# Patient Record
Sex: MALE | Race: WHITE
[De-identification: names, ages, dates, MRNs, and addresses within clinical notes are randomized per-mention and may not be internally consistent; named-entity substitution may affect disease eponyms.]

---

## 2021-12-11 ENCOUNTER — HOSPITAL ENCOUNTER (OUTPATIENT)
Dept: HOSPITAL 47 - LABPAT | Age: 61
Discharge: HOME | End: 2021-12-11
Attending: ORTHOPAEDIC SURGERY
Payer: COMMERCIAL

## 2021-12-11 DIAGNOSIS — Z01.812: Primary | ICD-10-CM

## 2021-12-11 DIAGNOSIS — Z22.322: ICD-10-CM

## 2021-12-11 DIAGNOSIS — M17.11: ICD-10-CM

## 2021-12-11 LAB
ANION GAP SERPL CALC-SCNC: 6 MMOL/L
BASOPHILS # BLD AUTO: 0.1 K/UL (ref 0–0.2)
BASOPHILS NFR BLD AUTO: 1 %
CHLORIDE SERPL-SCNC: 104 MMOL/L (ref 98–107)
CO2 SERPL-SCNC: 28 MMOL/L (ref 22–30)
EOSINOPHIL # BLD AUTO: 0.1 K/UL (ref 0–0.7)
EOSINOPHIL NFR BLD AUTO: 2 %
ERYTHROCYTE [DISTWIDTH] IN BLOOD BY AUTOMATED COUNT: 4.68 M/UL (ref 4.3–5.9)
ERYTHROCYTE [DISTWIDTH] IN BLOOD: 13.7 % (ref 11.5–15.5)
HCT VFR BLD AUTO: 42.1 % (ref 39–53)
HGB BLD-MCNC: 14.4 GM/DL (ref 13–17.5)
INR PPP: 1.1 (ref ?–1.2)
LYMPHOCYTES # SPEC AUTO: 1.8 K/UL (ref 1–4.8)
LYMPHOCYTES NFR SPEC AUTO: 32 %
MCH RBC QN AUTO: 30.8 PG (ref 25–35)
MCHC RBC AUTO-ENTMCNC: 34.3 G/DL (ref 31–37)
MCV RBC AUTO: 90 FL (ref 80–100)
MONOCYTES # BLD AUTO: 0.4 K/UL (ref 0–1)
MONOCYTES NFR BLD AUTO: 8 %
NEUTROPHILS # BLD AUTO: 2.9 K/UL (ref 1.3–7.7)
NEUTROPHILS NFR BLD AUTO: 53 %
PLATELET # BLD AUTO: 219 K/UL (ref 150–450)
POTASSIUM SERPL-SCNC: 4.4 MMOL/L (ref 3.5–5.1)
PT BLD: 11.2 SEC (ref 9–12)
SODIUM SERPL-SCNC: 138 MMOL/L (ref 137–145)
WBC # BLD AUTO: 5.4 K/UL (ref 3.8–10.6)

## 2021-12-11 PROCEDURE — 80051 ELECTROLYTE PANEL: CPT

## 2021-12-11 PROCEDURE — 93005 ELECTROCARDIOGRAM TRACING: CPT

## 2021-12-11 PROCEDURE — 85025 COMPLETE CBC W/AUTO DIFF WBC: CPT

## 2021-12-11 PROCEDURE — 85610 PROTHROMBIN TIME: CPT

## 2021-12-11 PROCEDURE — 71046 X-RAY EXAM CHEST 2 VIEWS: CPT

## 2021-12-11 PROCEDURE — 87070 CULTURE OTHR SPECIMN AEROBIC: CPT

## 2021-12-11 NOTE — XR
EXAMINATION TYPE: XR chest 2V

 

DATE OF EXAM: 12/11/2021

 

COMPARISON: Chest x-ray 10/26/2012

 

HISTORY: Z01.818

 

TECHNIQUE:  Frontal and lateral views of the chest are obtained.

 

FINDINGS:  There is no focal air space opacity, pleural effusion, or pneumothorax seen.  The cardiac 
silhouette size is within normal limits.   The osseous structures are intact.

 

IMPRESSION:  No acute cardiopulmonary process.

## 2022-01-28 ENCOUNTER — HOSPITAL ENCOUNTER (OUTPATIENT)
Dept: HOSPITAL 47 - LABPAT | Age: 62
Discharge: HOME | End: 2022-01-28
Attending: INTERNAL MEDICINE
Payer: COMMERCIAL

## 2022-01-28 DIAGNOSIS — Z20.822: ICD-10-CM

## 2022-01-28 DIAGNOSIS — Z01.812: Primary | ICD-10-CM

## 2022-01-28 LAB
ANION GAP SERPL CALC-SCNC: 11.7 MMOL/L (ref 10–18)
BUN SERPL-SCNC: 19.5 MG/DL (ref 9–27)
CHLORIDE SERPL-SCNC: 104 MMOL/L (ref 96–109)
CO2 SERPL-SCNC: 26.3 MMOL/L (ref 20–27.5)
ERYTHROCYTE [DISTWIDTH] IN BLOOD BY AUTOMATED COUNT: 4.58 X 10*6/UL (ref 4.4–5.6)
ERYTHROCYTE [DISTWIDTH] IN BLOOD: 13 % (ref 11.5–14.5)
HCT VFR BLD AUTO: 40.7 % (ref 39.6–50)
HGB BLD-MCNC: 13.7 G/DL (ref 13–17)
MCH RBC QN AUTO: 29.9 PG (ref 27–32)
MCHC RBC AUTO-ENTMCNC: 33.7 G/DL (ref 32–37)
MCV RBC AUTO: 88.9 FL (ref 80–97)
PLATELET # BLD AUTO: 212 X 10*3/UL (ref 140–440)
POTASSIUM SERPL-SCNC: 4.7 MMOL/L (ref 3.5–5.5)
SODIUM SERPL-SCNC: 142 MMOL/L (ref 135–145)
WBC # BLD AUTO: 6.08 X 10*3/UL (ref 4.5–10)

## 2022-01-28 PROCEDURE — 82565 ASSAY OF CREATININE: CPT

## 2022-01-28 PROCEDURE — 84520 ASSAY OF UREA NITROGEN: CPT

## 2022-01-28 PROCEDURE — 85027 COMPLETE CBC AUTOMATED: CPT

## 2022-01-28 PROCEDURE — 80051 ELECTROLYTE PANEL: CPT

## 2022-02-01 ENCOUNTER — HOSPITAL ENCOUNTER (OUTPATIENT)
Dept: HOSPITAL 47 - CATHCVL | Age: 62
Discharge: HOME | End: 2022-02-01
Attending: INTERNAL MEDICINE
Payer: COMMERCIAL

## 2022-02-01 VITALS — BODY MASS INDEX: 32.5 KG/M2

## 2022-02-01 VITALS — SYSTOLIC BLOOD PRESSURE: 158 MMHG | DIASTOLIC BLOOD PRESSURE: 74 MMHG | HEART RATE: 74 BPM

## 2022-02-01 VITALS — RESPIRATION RATE: 18 BRPM | TEMPERATURE: 97.3 F

## 2022-02-01 DIAGNOSIS — I42.0: Primary | ICD-10-CM

## 2022-02-01 PROCEDURE — 93458 L HRT ARTERY/VENTRICLE ANGIO: CPT

## 2022-02-01 NOTE — CC
CARDIAC CATHETERIZATION REPORT



INDICATION:

New-onset dilated cardiomyopathy.



PROCEDURE NOTE:

After obtaining informed consent, left heart catheterization and coronary angiogram

were performed via the right radial artery using size 3.5 Agnieszka right and left

coronary catheters and a pigtail catheter.  Patient tolerated the procedure well

without any obvious immediate complications.



Right radial artery access was obtained using micropuncture technique.  Catheters and

wires were floated into the ascending aorta under fluoroscopic guidance.  Patient

received verapamil and intravenous heparin as per protocol.  Patient received moderate

conscious sedation.  Total sedation time was 15 minutes.  At the end of the procedure,

a TR band was applied for hemostasis as per protocol. Adequate hemostasis and pulse ox

were obtained.



FINDINGS:

HEMODYNAMICS: Left ventricular end-diastolic pressure is 16 mm.  There is a gradient of

5 mm across the valve.



LEFT VENTRICULOGRAM: Left ventriculogram was not performed.



ANGIOGRAPHIC DATA:



Left main coronary artery. Left main coronary artery is a normal-sized vessel and is

free of stenosis.  It divides into left anterior descending coronary artery and

circumflex coronary artery. LAD and its branches, circumflex coronary artery and its

branches are free of significant stenosis. Circumflex coronary artery is a codominant

vessel.



Right coronary artery. Right coronary artery is a codominant system and is free of

significant disease.



CONCLUSIONS:

1. Normal coronary arteries.

2. Mildly elevated left ventricular end-diastolic pressures.



PLAN:

Patient's management is going to be with optimal medical therapy.  He is already on ACE

inhibitors. I will add Toprol-XL 25 mg daily.





MMODL / IJN: [id number] / Job#: [id number]

## 2022-09-14 ENCOUNTER — HOSPITAL ENCOUNTER (OUTPATIENT)
Dept: HOSPITAL 47 - LABPAT | Age: 62
Discharge: HOME | End: 2022-09-14
Attending: ORTHOPAEDIC SURGERY
Payer: COMMERCIAL

## 2022-09-14 DIAGNOSIS — Z01.812: Primary | ICD-10-CM

## 2022-09-14 DIAGNOSIS — M17.11: ICD-10-CM

## 2022-09-14 DIAGNOSIS — Z22.322: ICD-10-CM

## 2022-09-14 PROCEDURE — 87070 CULTURE OTHR SPECIMN AEROBIC: CPT

## 2022-10-13 NOTE — P.HPOR
History of Present Illness


H&P Date: 10/13/22


Chief Complaint: Right knee pain





The patient is a 62-year-old male who presents with progressive right knee pain 

for the past several years worsening recently.  He notes swelling, stiffness, 

and giving way.  It is worse with weightbearing activities.  He tried previous 

medications and injections with only partial temporary relief.  He is having 

significant night symptoms.





Review of Systems





As per HPI





Past Medical History


Past Medical History: Asthma, COPD, Hyperlipidemia, Hypertension, Osteoarthritis

(OA)


Additional Past Medical History / Comment(s): Heart diseaseleft bundle branch 

block


History of Any Multi-Drug Resistant Organisms: None Reported


Past Surgical History: Heart Catheterization, Orthopedic Surgery


Additional Past Surgical History / Comment(s): rt hand trigger thumb, rt carpal 

tunnel, lump removed from neck, ganglion cyst removed right foot


Past Anesthesia/Blood Transfusion Reactions: No Reported Reaction


Smoking Status: Former smoker





- Past Family History


  ** Mother


Family Medical History: No Reported History





Medications and Allergies


                                Home Medications











 Medication  Instructions  Recorded  Confirmed  Type


 


Albuterol Inhaler [Ventolin 2 puff INHALATION Q6HR PRN 07/12/14 10/13/22 History





Inhaler]    


 


Ipratropium/Albuterol Sulfate 3 ml INHALATION QID PRN 07/12/14 10/13/22 History





[Duoneb 0.5 mg-3 mg/3 ml Soln]    


 


lisinopriL [Zestril] 10 mg PO HS 07/12/14 10/13/22 History


 


EPINEPHrine [Epipen 2-Alf] 0.3 mg IM ONCE PRN #1 ml 07/13/14 10/13/22 Rx


 


Atorvastatin [Lipitor] 20 mg PO HS 12/29/21 10/13/22 History


 


Desvenlafaxine Succinate [Pristiq] 100 mg PO DAILY 12/29/21 10/13/22 History


 


QUEtiapine [SEROquel] 100 mg PO HS 12/29/21 10/13/22 History


 


buPROPion HCL [Wellbutrin XL] 300 mg PO DAILY 12/29/21 10/13/22 History








                                    Allergies











Allergy/AdvReac Type Severity Reaction Status Date / Time


 


amlodipine besylate Allergy  Unknown Verified 10/13/22 10:20





[From Sahyan]     


 


budesonide [From Pulmicort] Allergy  Unknown Verified 10/13/22 10:20


 


dyclonine HCl [From Sucrets] Allergy  shortness Verified 10/13/22 10:20





   of breath  


 


hexylresorcinol Allergy  Unknown Verified 10/13/22 10:20





[From Sucrets]     


 


olmesartan medoxomil Allergy  Unknown Verified 10/13/22 10:20





[From Shayan]     


 


penicillin V potassium Allergy  Unknown Verified 10/13/22 10:20





[From Pen-Vee K]   Childhood  


 


venom-honey bee Allergy  Unknown Verified 10/13/22 10:20





[bee venom (honey bee)]     














Physical Examination





- Knee


  ** right


Appearance: effusion


Effusion grade: grade 1


Varus alignment in stance: 5 degrees


Tenderness with palpation: medial


Pain: with flexion


Gait: limping


ROM: extension: -20 degrees


ROM: flexion: 90 degrees


Strength: extension: 5/5


Strength: flexion: 5/5


Meniscal tests: medial meniscal tests: positive





Results


The patient is a 62-year-old male who is  well-developed and well-nourished, 

approximate 5 foot 8, 214 pounds of endomorphic habits.  HEENT exam is nonfocal,

neck is supple.  He has painless passive motion of the right hip.  Straight leg 

raise negative.  His distal neurovascular appears intact in the right lower 

extremity.








- Diagnostic results


Knee x-ray: image reviewed (3 views of the right knee obtained in the office 

show severe medial and patellofemoral compartment narrowing with bone-on-bone 

changes and subchondral sclerosis.)





Assessment and Plan


Assessment: 





Right knee severe medial and patellofemoral compartment osteoarthrosis


Plan: 





I talked the patient length regarding his condition, treatment options.  At this

point is quite limited because of pain related to his right knee has significant

pain related to his right knee osteoarthrosis despite conservative measures.  

After a thorough discussion he opted to proceed with surgery.  We'll plan to 

proceed with right total knee arthroplasty.  We will institute DVT prophylaxis 

postoperative.  The patient underwent preoperative medical and cardiac 

clearance.


Time with Patient: Less than 30

## 2022-10-14 ENCOUNTER — HOSPITAL ENCOUNTER (OUTPATIENT)
Dept: HOSPITAL 47 - OR | Age: 62
Setting detail: OBSERVATION
LOS: 1 days | Discharge: HOME HEALTH | End: 2022-10-15
Attending: ORTHOPAEDIC SURGERY | Admitting: ORTHOPAEDIC SURGERY
Payer: COMMERCIAL

## 2022-10-14 VITALS — RESPIRATION RATE: 16 BRPM

## 2022-10-14 DIAGNOSIS — Z91.030: ICD-10-CM

## 2022-10-14 DIAGNOSIS — J44.9: ICD-10-CM

## 2022-10-14 DIAGNOSIS — R11.2: ICD-10-CM

## 2022-10-14 DIAGNOSIS — I44.7: ICD-10-CM

## 2022-10-14 DIAGNOSIS — Z88.0: ICD-10-CM

## 2022-10-14 DIAGNOSIS — Z79.899: ICD-10-CM

## 2022-10-14 DIAGNOSIS — E78.5: ICD-10-CM

## 2022-10-14 DIAGNOSIS — I10: ICD-10-CM

## 2022-10-14 DIAGNOSIS — Z91.09: ICD-10-CM

## 2022-10-14 DIAGNOSIS — M17.11: Primary | ICD-10-CM

## 2022-10-14 DIAGNOSIS — Z87.891: ICD-10-CM

## 2022-10-14 DIAGNOSIS — Z98.890: ICD-10-CM

## 2022-10-14 DIAGNOSIS — F32.A: ICD-10-CM

## 2022-10-14 PROCEDURE — 64999 UNLISTED PX NERVOUS SYSTEM: CPT

## 2022-10-14 PROCEDURE — 64448 NJX AA&/STRD FEM NRV NFS IMG: CPT

## 2022-10-14 PROCEDURE — 88300 SURGICAL PATH GROSS: CPT

## 2022-10-14 PROCEDURE — 27447 TOTAL KNEE ARTHROPLASTY: CPT

## 2022-10-14 PROCEDURE — 97162 PT EVAL MOD COMPLEX 30 MIN: CPT

## 2022-10-14 PROCEDURE — 85025 COMPLETE CBC W/AUTO DIFF WBC: CPT

## 2022-10-14 PROCEDURE — 73560 X-RAY EXAM OF KNEE 1 OR 2: CPT

## 2022-10-14 PROCEDURE — 94640 AIRWAY INHALATION TREATMENT: CPT

## 2022-10-14 PROCEDURE — 76942 ECHO GUIDE FOR BIOPSY: CPT

## 2022-10-14 RX ADMIN — HYDROMORPHONE HYDROCHLORIDE PRN MG: 1 INJECTION, SOLUTION INTRAMUSCULAR; INTRAVENOUS; SUBCUTANEOUS at 15:08

## 2022-10-14 RX ADMIN — HYDROMORPHONE HYDROCHLORIDE PRN MG: 1 INJECTION, SOLUTION INTRAMUSCULAR; INTRAVENOUS; SUBCUTANEOUS at 15:00

## 2022-10-14 RX ADMIN — HYDROMORPHONE HYDROCHLORIDE PRN MG: 1 INJECTION, SOLUTION INTRAMUSCULAR; INTRAVENOUS; SUBCUTANEOUS at 16:03

## 2022-10-14 RX ADMIN — HYDROMORPHONE HYDROCHLORIDE PRN MG: 1 INJECTION, SOLUTION INTRAMUSCULAR; INTRAVENOUS; SUBCUTANEOUS at 14:10

## 2022-10-14 RX ADMIN — HYDROMORPHONE HYDROCHLORIDE PRN MG: 1 INJECTION, SOLUTION INTRAMUSCULAR; INTRAVENOUS; SUBCUTANEOUS at 20:32

## 2022-10-14 RX ADMIN — HYDROCODONE BITARTRATE AND ACETAMINOPHEN PRN EACH: 7.5; 325 TABLET ORAL at 16:44

## 2022-10-14 RX ADMIN — POTASSIUM CHLORIDE SCH MLS: 14.9 INJECTION, SOLUTION INTRAVENOUS at 09:32

## 2022-10-14 RX ADMIN — HYDROMORPHONE HYDROCHLORIDE PRN MG: 1 INJECTION, SOLUTION INTRAMUSCULAR; INTRAVENOUS; SUBCUTANEOUS at 17:46

## 2022-10-14 NOTE — XR
Right knee Limited

 

HISTORY: Status post right knee arthroplasty

 

2 views the right knee, correlation to prior right knee 9/14/2022

 

Bone mineralization is reduced. Patient is status post right knee arthroplasty. There is anatomic ali
gnment. Lucencies present within the soft tissues. There is soft tissue swelling. Prepatellar calcifi
c density is again noted. Enthesophyte at insertion of the quadriceps tendon is stable.

 

IMPRESSION: Orthopedic follow-up.

## 2022-10-14 NOTE — P.ANPRN
Procedure Note - Anesthesia





- Nerve Block Performed


  ** Right Adductor Canal Infusion


Time Out Performed: Yes (0945)


Date of Procedure: 10/14/22


Procedure Start Time: 09:46


Procedure Stop Time: 09:51


Location of Patient: PreOp


Indication: Acute Post-Operative Pain, Requested by Surgeon


Specifically requested for management of pain by : Adrian Moe


Sedation Type: Sedate with meaningful contact maintained


Preparation: Sterile Prep, Sterile Dressing


Position: Supine


Catheter Depth at Skin (cm): 8


Catheter: Indwelling


Needle Types: Manuel


Needle Gauge: 18


Ultrasound used to visualize needle placement: Yes


Ultrasound used to observe medication spread: Yes


Injectate: 0.5% Ropivacaine (see comment for volume) (15cc + 5cc nacl pf)


Blood Aspirated: No


Pain Paresthesia on Injection Noted: No


Resistance on Injection: Normal


Image Stored and Saved: Yes


Events: Uneventful and Well Tolerated

## 2022-10-14 NOTE — P.ANPRN
Procedure Note - Anesthesia





- Nerve Block Performed


  ** Right iPack Single


Time Out Performed: Yes (0945)


Date of Procedure: 10/14/22


Procedure Start Time: 09:52


Procedure Stop Time: 09:55


Location of Patient: PreOp


Indication: Acute Post-Operative Pain, Requested by Surgeon


Specifically requested for management of pain by DrKhalif: Adrian Moe


Sedation Type: Sedate with meaningful contact maintained


Preparation: Sterile Prep


Position: Supine


Catheter: None


Needle Types: Pajunk


Needle Gauge: 21


Ultrasound used to visualize needle placement: Yes


Ultrasound used to observe medication spread: Yes


Injectate: 0.5% Ropivacaine (see comment for volume) (15cc + 5cc nacl pf)


Blood Aspirated: No


Pain Paresthesia on Injection Noted: No


Resistance on Injection: Normal


Image Stored and Saved: Yes


Events: Uneventful and Well Tolerated

## 2022-10-14 NOTE — P.OP
Date of Procedure: 10/14/22


Preoperative Diagnosis: 


Right knee severe tricompartmental osteoarthrosis


Postoperative Diagnosis: 


Same


Procedure(s) Performed: 


Right total knee arthroplastycementedcruciate retaining


Implants: 


Depuy Attune size 7 cemented femoral component, size 6 cemented tibial 

component, 9 mm articular surface, 38 mm cemented patellar component.  This is a

cruciate retaining implant.


Anesthesia: regional, spinal


Surgeon: Adrian Moe


Assistant #1: Colin Monroe


Estimated Blood Loss (ml): 50


Pathology: other (Bone fragments)


Condition: stable


Disposition: PACU


Indications for Procedure: 


The patient is a 62-year-old male presents with progressive right knee pain 

secondary to osteoarthrosis despite conservative measures.  A discussion of the 

risks and benefits of operative intervention versus continued conservative 

measures was made with the patient.  He opted to proceed with surgery.  

Operative risks to include infection, neurovascular injury, development of blood

clots, fracture, possible component loosening/failure need for subsequent 

procedures was discussed.  Informed consent was obtained.


Operative Findings: 


As below


Description of Procedure: 


The patient was brought to the operating room, and after induction of spinal 

anesthesia the right lower extremity was prepped and draped in a normal fashion.

 The tourniquet was inflated to 270 mmHg.  A longitudinal incision extending 3 

finger breaths above  the superior pole of the patella extending to the medial 

aspect the tibial tubercle was then made.  The skin and subcutaneous tissues 

were divided sharply.  Electrocautery was used for hemostasis.  A medial 

parapatellar arthrotomy was then performed.  The medial soft tissues to include 

the superficial and deep portions of the medial collateral ligament as well as 

the medial hamstring tendons were elevated subperiosteally.  The proximal medial

tibia osteophytes were carefully removed.  The patella was everted.  The knee 

was flexed.  A portion of the retropatellar fat pad was excised sharply.  The 

anterior cruciate ligament was sacrificed.  A starting hole was made in the 

distal femur 1 cm anterior to the posterior cruciate origin.  An intramedullary 

femoral guide was gently inserted planning on 5  valgus distal cut with 9 mm 

distal resection.  The cutting block was pinned in place.  The distal cut was 

then made.  The posterior referencing sizing guide was utilized.  3  of 

external rotation was built into the system and verified off the trans-

epicondylar axis and the posterior condyles.  I felt size 7 was most 

appropriate.  The cutting block was pinned in place.  The anterior, posterior, 

and chamfer cuts were then made.  The bone fragments were removed.  A sulcus cut

was then made with the appropriate guide.  The trial size 7 femoral component 

was then placed and was fully seated.  There was good anterior to posterior and 

medial to lateral fit.  The distal peg holes were then drilled.  The trial 

component was then removed.  Attention was then paid towards preparing the 

proximal tibia.  An extra medullary guide was utilized in line with the tibial 

shaft and second metatarsal distally.  A 7  posterior slope was planned.  I 

planned on 2 mm resection from the medial compartment.  The cutting block was 

pinned in place.  The proximal tibial cut was then made.  The bone was removed 

in one fragment.  The remnants of the medial and lateral menisci were excised 

the capsule junction with electrocautery.  The tibia sized most appropriately at

size 6.  The posterior osteophytes off the distal femur were carefully removed 

with a curved osteotome.  The trial tibial and femoral components were placed 

along with a 9 millimeters articular surface.  I was able to obtain full flexion

and extension with good stability with varus and valgus stress.  After several 

flexion and extension cycles, the tibial rotation was marked with electrocautery

in line with the medial one third of the tibial tubercle.  Attention was then 

paid towards preparing the patella.  A patella reamer was utilized taking this 

down to 14 mm of bone stock.  A good flush cut was made.  The patella sized most

appropriately at 38 millimeters.  The peg holes were then drilled.  The trial 

component was placed.  The knee was taken through a range of motion.  I had good

patellofemoral tracking with no hands technique.  The trial components were then

removed.  The tibia was prepared in the appropriate rotation with appropriate 

drill and keel punch.  The flexion and extension gaps were checked and felt to 

be symmetric.  The posterior soft tissues were injected with ropivacaine.  The 

bony surfaces were prepared with pulsatile lavage and dried.  The deep tibial 

component was then cemented in place and was fully seated.  Excess cement was 

removed.  The femoral component was cemented in place and was fully seated.  

Again excess cement was removed.  The trial 9 millimeters surface was then 

inserted in the knee was put in full extension.  The patella component was 

cemented in place.  After the cement had sufficiently hardened, the knee was 

again taken through a range of motion.  Again there was good stability in 

flexion and extension with varus and valgus stress.  The trial articular surface

was then removed.  The final articular surface was placed and was impacted.  

Care was taken to avoid any soft tissue interposition.  Pulsatile lavage was 

again utilized.  The tourniquet was deflated with  approximately 60 minutes 

total tourniquet time.  There was minimal drainage therefore a deep drain was 

not placed.  The medial parapatellar arthrotomy was then closed with #2 Ethibond

suture.  The subcutaneous tissues were reapproximated interrupted 2-0 Vicryl 

sutures.  The skin was reapproximated with 3-0 subarticular strata fix suture.  

Skin tape and adhesive was applied.  A sterile dressing was applied.  The 

patient was then awoken from sedation and transferred to recovery room in good 

condition.  Blood loss was estimated at 50 milliliters.  No complications  were 

incurred.  Sponge and needle counts were correct at the end the case. Colin YOUSSEF assisted during the major components this case to include exposure, 

bone resection, and implantation.

## 2022-10-14 NOTE — P.CONS
History of Present Illness





- Reason for Consult


Consult date: 10/14/22





- History of Present Illness





The patient is a 62-year-old male with a PMH of COPD, hypertension, 

hyperlipidemia, and osteoarthritis who was admitted for an elective right total 

knee replacement.  The patient underwent the procedure earlier today with no 

immediate postoperative consultations.  The patient was seen at the bedside on 

the surgical unit.  He reported good control of his pain, rated at a 3 out of 10

at the time of interview.  He denied any additional complaints.  Reports that he

has not gotten out of bed as of yet and has not had a bowel movement or passed 

status.  Did report an episode of nausea which was alleviated with Zofran.  

Denies experiencing chest discomfort, sore throat, shortness of breath, fever, 

chills, cough.  Reports compliance with his medications at home.





Review of systems:


Pertinent positives and negatives as discussed in HPI, a complete review of 

systems was performed and all other systems are negative.





Physical examination:


General: non toxic, no distress, appears at stated age, obese


Derm: no unusual rashes/lesions, warm


Head: atraumatic, normocephalic, symmetric


Eyes: EOMI, no lid lag, anicteric sclera, pupils equal round reactive to light


ENT: Nose and ears atraumatic


Neck: No cervical lymphadenopathy, trachea midline, supple


Mouth: no lip lesion, mucus membranes moist


Cardiovascular: S1S2 reg, no murmur, positive dorsalis pedis pulse bilateral, no

edema


Lungs: CTA bilateral, no rhonchi, no rales, no accessory muscle use


Abdominal: soft,  nontender to palpation, no guarding


Ext: muscle strength 5 out of 5 in all extremities grossly except right lower 

extremity due to pain, right lower extremity Ace bandage in place, no gross 

muscle atrophy, no contractures, 


Neuro:  CN II-XI grossly intact, no gross focal neuro deficits


Psych: Alert, oriented, appropriate affect 





Assessment/plan





Chronic conditions: COPD, hypertension, hyperlipidemia


-Continue with home medications





Status post right total knee replacement


-Defer management including pain control and DVT prophylaxis to the primary 

surgery service





We appreciate this opportunity to be involved in this patient's care. We will 

follow the patient with you. For any further questions, please not hesitate to 

contact the sound inpatient team.





Past Medical History


Past Medical History: Asthma, COPD, Hyperlipidemia, Hypertension, Osteoarthritis

(OA)


Additional Past Medical History / Comment(s): Heart diseaseleft bundle branch 

block


History of Any Multi-Drug Resistant Organisms: None Reported


Past Surgical History: Heart Catheterization, Orthopedic Surgery


Additional Past Surgical History / Comment(s): rt hand trigger thumb, rt carpal 

tunnel, lump removed from neck, ganglion cyst removed right foot


Past Anesthesia/Blood Transfusion Reactions: No Reported Reaction


Past Psychological History: Depression


Smoking Status: Former smoker


Past Alcohol Use History: Occasional


Additional Past Alcohol Use History / Comment(s): quit smoking 10 years ago, 

1ppd, started in teens


Past Drug Use History: None Reported





- Past Family History


  ** Mother


Family Medical History: Hyperlipidemia





Medications and Allergies


                                Home Medications











 Medication  Instructions  Recorded  Confirmed  Type


 


Albuterol Inhaler [Ventolin 2 puff INHALATION Q6HR PRN 07/12/14 10/14/22 History





Inhaler]    


 


Ipratropium/Albuterol Sulfate 3 ml INHALATION QID PRN 07/12/14 10/14/22 History





[Duoneb 0.5 mg-3 mg/3 ml Soln]    


 


lisinopriL [Zestril] 10 mg PO HS 07/12/14 10/14/22 History


 


EPINEPHrine [Epipen 2-Alf] 0.3 mg IM ONCE PRN #1 ml 07/13/14 10/14/22 Rx


 


Atorvastatin [Lipitor] 20 mg PO HS 12/29/21 10/14/22 History


 


Desvenlafaxine Succinate [Pristiq] 100 mg PO DAILY 12/29/21 10/14/22 History


 


QUEtiapine [SEROquel] 100 mg PO HS 12/29/21 10/14/22 History


 


buPROPion HCL [Wellbutrin XL] 300 mg PO DAILY 12/29/21 10/14/22 History








                                    Allergies











Allergy/AdvReac Type Severity Reaction Status Date / Time


 


amlodipine besylate Allergy  Unknown Verified 10/14/22 08:54





[From Shayan]     


 


budesonide [From Pulmicort] Allergy  Unknown Verified 10/14/22 08:54


 


dyclonine HCl [From Sucrets] Allergy  shortness Verified 10/14/22 08:54





   of breath  


 


hexylresorcinol Allergy  Unknown Verified 10/14/22 08:54





[From Sucrets]     


 


olmesartan medoxomil Allergy  Unknown Verified 10/14/22 08:54





[From Shayan]     


 


penicillin V potassium Allergy  Unknown Verified 10/14/22 08:54





[From Pen-Vee K]   Childhood  


 


venom-honey bee Allergy  Unknown Verified 10/14/22 08:54





[bee venom (honey bee)]     














Physical Exam


Vitals: 


                                   Vital Signs











  Temp Pulse Pulse Pulse Resp BP BP


 


 10/14/22 18:14    88     163/80


 


 10/14/22 17:19  97.5 F L   97   17   182/95


 


 10/14/22 16:55    72   16   165/84


 


 10/14/22 16:40    92   18   185/85


 


 10/14/22 16:00   77    16   149/90


 


 10/14/22 15:30   75    16   149/104


 


 10/14/22 15:00   72    16   162/78


 


 10/14/22 14:30   77    18   161/77


 


 10/14/22 14:00   78    20   164/76


 


 10/14/22 13:30   61    16   156/83


 


 10/14/22 13:15   60    12   147/84


 


 10/14/22 13:00   75    16  147/81 


 


 10/14/22 12:42  96.8 F L  65    14  142/91 


 


 10/14/22 10:00     76  16  136/71 


 


 10/14/22 09:32  97.4 F L    87  16  174/91 














  Pulse Ox


 


 10/14/22 18:14 


 


 10/14/22 17:19  95


 


 10/14/22 16:55  96


 


 10/14/22 16:40  98


 


 10/14/22 16:00  97


 


 10/14/22 15:30  95


 


 10/14/22 15:00  95


 


 10/14/22 14:30  98


 


 10/14/22 14:00  99


 


 10/14/22 13:30  99


 


 10/14/22 13:15  98


 


 10/14/22 13:00  99


 


 10/14/22 12:42  100


 


 10/14/22 10:00  98


 


 10/14/22 09:32  98








                                Intake and Output











 10/14/22 10/14/22 10/14/22





 06:59 14:59 22:59


 


Intake Total  851 350


 


Output Total  50 400


 


Balance  801 -50


 


Intake:   


 


  IV  851 350


 


Output:   


 


  Urine   400


 


  Estimated Blood Loss  50 


 


Other:   


 


  # Voids   1


 


  Weight  94.6 kg 94.6 kg

## 2022-10-15 VITALS — TEMPERATURE: 98.6 F | SYSTOLIC BLOOD PRESSURE: 166 MMHG | DIASTOLIC BLOOD PRESSURE: 91 MMHG

## 2022-10-15 VITALS — HEART RATE: 78 BPM

## 2022-10-15 LAB
BASOPHILS # BLD AUTO: 0.03 X 10*3/UL (ref 0–0.1)
BASOPHILS NFR BLD AUTO: 0.2 %
EOSINOPHIL # BLD AUTO: 0 X 10*3/UL (ref 0.04–0.35)
EOSINOPHIL NFR BLD AUTO: 0 %
ERYTHROCYTE [DISTWIDTH] IN BLOOD BY AUTOMATED COUNT: 4.32 X 10*6/UL (ref 4.4–5.6)
ERYTHROCYTE [DISTWIDTH] IN BLOOD: 13.3 % (ref 11.5–14.5)
HCT VFR BLD AUTO: 38.4 % (ref 39.6–50)
HGB BLD-MCNC: 13 G/DL (ref 13–17)
IMM GRANULOCYTES BLD QL AUTO: 0.7 %
LYMPHOCYTES # SPEC AUTO: 1.3 X 10*3/UL (ref 0.9–5)
LYMPHOCYTES NFR SPEC AUTO: 9.3 %
MCH RBC QN AUTO: 30.1 PG (ref 27–32)
MCHC RBC AUTO-ENTMCNC: 33.9 G/DL (ref 32–37)
MCV RBC AUTO: 88.9 FL (ref 80–97)
MONOCYTES # BLD AUTO: 1.7 X 10*3/UL (ref 0.2–1)
MONOCYTES NFR BLD AUTO: 12.2 %
NEUTROPHILS # BLD AUTO: 10.86 X 10*3/UL (ref 1.8–7.7)
NEUTROPHILS NFR BLD AUTO: 77.6 %
NRBC BLD AUTO-RTO: 0 /100 WBCS (ref 0–0)
PLATELET # BLD AUTO: 253 X 10*3/UL (ref 140–440)
WBC # BLD AUTO: 13.99 X 10*3/UL (ref 4.5–10)

## 2022-10-15 RX ADMIN — POTASSIUM CHLORIDE SCH: 14.9 INJECTION, SOLUTION INTRAVENOUS at 07:52

## 2022-10-15 RX ADMIN — HYDROMORPHONE HYDROCHLORIDE PRN MG: 1 INJECTION, SOLUTION INTRAMUSCULAR; INTRAVENOUS; SUBCUTANEOUS at 10:41

## 2022-10-15 RX ADMIN — HYDROMORPHONE HYDROCHLORIDE PRN MG: 1 INJECTION, SOLUTION INTRAMUSCULAR; INTRAVENOUS; SUBCUTANEOUS at 00:42

## 2022-10-15 RX ADMIN — HYDROCODONE BITARTRATE AND ACETAMINOPHEN PRN EACH: 7.5; 325 TABLET ORAL at 07:56

## 2022-10-15 NOTE — P.PN
Progress Note - Text


Progress Note Date: 10/15/22 (0619)





Anesthesiology


Postop day 1 status post total knee arthroplasty with adductor canal catheter.  

Patient doing well.  VAS 8 out of 10rest able and tolerable.  Gross strength 

intact in lower extremity.  Denies fever.  Denies alterations in sensorium.  

Catheter site intact.





Heart regular rate


Lungs nonlabored


Abdomen nondistended





Assessment: Postop day 1 status post total knee arthroplasty with adductor canal

catheter


Plan: All questions answered.  Maintain catheter 2 more days with patient 

removal at home.  Instructions were given at discharge.

## 2022-10-15 NOTE — P.PN
Subjective


Progress Note Date: 10/15/22


Principal diagnosis: 





s/p knee replacement


Hospital Course: 





62-year-old male with a PMH of COPD, hypertension, hyperlipidemia, and 

osteoarthritis who was admitted for an elective right total knee replacement.  

The patient underwent the procedure yesterday with no immediate postoperative 

consultations.  Working with physical therapy.  Progressing well.





Subjective: 


Patient seen and examined at bedside.  No acute events overnight.  Patient felt 

a little sad about not being able to fully walk.  However, he does state that he

will slowly regain back his function.  He denies any chest pain, shortness of 

breath, abdominal pain, urinary or bowel complaints.





Pertinent positives and negatives as discussed above, a complete review of 

systems was performed and all other systems are negative.








Vitals Signs Reviewed. 





General: nontoxic, no distress, appears at stated age


Derm: warm, dry, dressing over right knee appears clean, dry, intact


Head: atraumatic, normocephalic, symmetric


Eyes: EOMI, no lid lag, anicteric sclera


Mouth: no lip lesion, mucus membranes moist


Cardiovascular: S1S2 reg, no murmur


Lungs: CTA bilateral, no rhonchi, no rales , no accessory muscle use


Abdominal: soft,  nontender to palpation, no guarding, no appreciable 

organomegaly


Ext: no gross muscle atrophy, no edema, no contractures, abductor canal pain 

catheter present on the right knee


Neuro:  CN II-XI grossly intact, no focal neuro deficits


Psych: Alert, oriented, appropriate affect 








Assessment and Plan:





Status post right total knee replacement


- DVT prophylaxis and pain management per orthopedics


-Anticipate discharge home today





Chronic medical conditions:


Hypertension


COPD


Dyslipidemia


Depression


-Continue home medications


-Medically optimized for discharge








Thank you for allowing us to participate in the care of this pleasant patient.  

Do not hesitate to contact us with questions.  Someone can be reached from the 

[facility] hospitalist group all hours of the day at [phone] or via 

perfect serve.








Objective





- Vital Signs


Vital signs: 


                                   Vital Signs











Temp  98.6 F   10/15/22 08:00


 


Pulse  78   10/15/22 09:42


 


Resp  16   10/15/22 08:00


 


BP  166/91   10/15/22 08:00


 


Pulse Ox  97   10/15/22 08:00


 


FiO2      








                                 Intake & Output











 10/14/22 10/15/22 10/15/22





 18:59 06:59 18:59


 


Intake Total 1201  


 


Output Total 450 700 


 


Balance 751 -700 


 


Weight 94.6 kg  


 


Intake:   


 


  IV 1201  


 


Output:   


 


  Urine 400 700 


 


  Estimated Blood Loss 50  


 


Other:   


 


  # Voids 1 1 














- Labs


CBC & Chem 7: 


                                 10/15/22 05:40





Labs: 


                  Abnormal Lab Results - Last 24 Hours (Table)











  10/15/22 Range/Units





  05:40 


 


WBC  13.99 H  (4.50-10.00)  X 10*3/uL


 


RBC  4.32 L  (4.40-5.60)  X 10*6/uL


 


Hct  38.4 L  (39.6-50.0)  %


 


Immature Gran #  0.10 H  (0.00-0.04)  X 10*3/uL


 


Neutrophils #  10.86 H  (1.80-7.70)  X 10*3/uL


 


Monocytes #  1.70 H  (0.20-1.00)  X 10*3/uL


 


Eosinophils #  0 L  (0.04-0.35)  X 10*3/uL

## 2022-10-15 NOTE — P.PN
Subjective


Progress Note Date: 10/15/22


Principal diagnosis: 





Right knee osteoarthritis


Patient was seen at bedside this morning resting comfortably in bed.  Patient 

says he has not worked with physical therapy yet today.  Patient says he would 

like to go home today and has his wife at home that can help him out.  Patient 

says he does have a walker for home.  Patient says he does have pain mostly 

located at the front and back of the knee currently.  Patient denies radiation 

of pain.  Patient says he has urinated several times since surgery yesterday.  

Patient says he has not had bowel movement yet, however, patient says he has 

been passing gas.  Patient denies chest pain, fever, chest breath, nausea, 

vomiting, change in vision, loss of bowel/bladder control.








Objective





- Vital Signs


Vital signs: 


                                   Vital Signs











Temp  98.6 F   10/15/22 08:00


 


Pulse  80   10/15/22 08:00


 


Resp  16   10/15/22 08:00


 


BP  166/91   10/15/22 08:00


 


Pulse Ox  97   10/15/22 08:00


 


FiO2      








                                 Intake & Output











 10/14/22 10/15/22 10/15/22





 18:59 06:59 18:59


 


Intake Total 1201  


 


Output Total 450 700 


 


Balance 751 -700 


 


Weight 94.6 kg  


 


Intake:   


 


  IV 1201  


 


Output:   


 


  Urine 400 700 


 


  Estimated Blood Loss 50  


 


Other:   


 


  # Voids 1 1 














- Exam


Right knee:


Incision is clean, dry, and intact.  The exofin fusion tape is in good 

condition.  There is minimal soft tissue swelling and ecchymosis surrounding the

medial and lateral aspects of the incision.  Calf is soft, no tenderness with 

palpation.  Plantar flexion, dorsiflexion, EHL, FHL are intact.  Sensory exam to

light touch throughout the extremity is intact, dorsal pedis pulses 2+.








Assessment and Plan


Assessment: 


1.  Right knee osteoarthritis





- Postoperative day 1 status post right total knee arthroplasty





Plan: 


1.  Right knee osteoarthritis - right total knee arthroplasty from yesterday, 

Friday, 10/14/2022.  Patient stable at bedside this morning.  Patient does have 

a walker at home.  Discharge home with health services today pending labs and 

physical therapy evaluation





2.  Appreciate medical management





3.  Pain management - Norco; IV meds only as necessary





4.  DVT prophylaxis - Xarelto in hospital. Eliquis 2.5 mg BID x 2 weeks





5.  GI prophylaxis - senna





6.  PT/OT - weightbearing as tolerated with walker





7.  Encourage incentive spirometer use





8.  Discharge planning - home with health services today.





Time with Patient: Less than 30

## 2022-10-15 NOTE — P.DS
Providers


Date of admission: 


10/15/22 05:17





Expected date of discharge: 10/15/22


Attending physician: 


Adrian Moe





Consults: 





                                        





10/14/22 12:57


Consult Physician Routine 


   Consulting Provider: Deonna Ryder


   Consult Reason/Comments: Medical Management s/p RTKA


   Do you want consulting provider notified?: Yes











Primary care physician: 


[facility] Course: 


Date of admission: 10/14/2022


Date of discharge: 10/15/2022





Admission diagnosis: Right knee osteoarthritis


Discharge diagnosis: Same





Attending physician: Dr. Moe





Surgical procedures: Right total knee arthroplasty





Brief history: Patient is a 62-year-old male with a history of progressive 

primary right knee osteoarthritis.  At this point patient has failed 

conservative treatment measures and has opted to proceed with a elective right 

total knee arthroplasty.





Hospital course: Details of patient's surgery can be found in operative report. 

Patient tolerated the procedure well and was subsequently transported to 

orthopedic floor.  Patient's orthopeidc and medical care was provided daily. 

Patient had daily laboratory tests performed for evaluation of overall blood 

counts.  Patient had daily physical therapy to include strengthening range of 

motion as well as education with walker ambulation. Patient was treated with 

Xarelto for their postoperative DVT prophylaxis during their inpatient stay.  

Patient was noted to have a relatively uneventful postoperative course.  Patient

reported satisfactory pain control with oral pain medications by postoperative 

day 1.  Patient showed satisfactory progress with physical therapy.  Patient 

moved steadily through the program and had no difficulty meeting the goals by 

postoperative day 1.  Given patient's otherwise satisfactory course and having 

met physical therapy goals, plan is to discharge patient home with health 

services on postoperative day 1.





Discharge condition/disposition: Patient will be discharged home with health 

services in stable condition.





Discharge medications: Instructions are given on resumption of patient's normal 

daily medications per primary care recommendation, in addition patient will be 

prescribed Norco; Colace; Eliquis 2.5 mg BID x 2 weeks.





Discharge instructions:


1.  Wound care and infection precautions, keep incision dry and covered while 

showering, no lotions, creams, moisturizers. No soaking, tubs, pools, hottubs. 

Do not scrub over the incision.


2.  Weight-bear as tolerated with walker / cane until follow-up.


3.  Ice and elevate when necessary.  Do not exceed 20 minutes per hour with ice 

pack.


4.  Utilize compression sleeve until seen at first follow up appointment.


5.  Visiting nursing care.


6.  Home physical therapy including home CPM.


7.  Pain meds and anticoagulants per prescription.


8.  Pain medication has potential to cause constipation. Increase oral fluid and

fiber intake. Contact primary care provider if you have not had a bowel movement

within 48 hours after discharge


9.  No anti-inflammatory medication until discussed at first post operative 

visit, this including Motrin, Aleve, Mobic, Diclofenac.


10.  Follow up in office at 2 weeks postop with Mateus Chandler PA-C / Colin Monroe PA-C


11. Follow up with your primary care doctor 7-10 days after discharge.


12. Contact [facility] with any questions, [phone].





Keep incision clean, dry, intact.  While showering, cover fusion tape with Saran

wrap.  Keep fusion tape on until follow-up appointment in office in 2 weeks





Medications: Norco; Colace; Eliquis 2.5 mg BID x 2 weeks.











Assessment: 


Right knee osteoarthritis


Procedures: 


Right total knee arthroplasty





Patient Condition at Discharge: Good





Plan - Discharge Summary


Discharge Rx Participant: No


New Discharge Prescriptions: 


New


   Docusate [Colace] 100 mg PO DAILY #30 capsule


   Apixaban [Eliquis] 2.5 mg PO BID #60 tab


   HYDROcodone/APAP 7.5-325MG [Norco 7.5] 1 - 2 each PO Q6HR PRN #36 tab


     PRN Reason: Pain





No Action


   Albuterol Inhaler [Ventolin Inhaler] 2 puff INHALATION Q6HR PRN


     PRN Reason: Shortness Of Breath


   lisinopriL [Zestril] 10 mg PO HS


   Ipratropium/Albuterol Sulfate [Duoneb 0.5 mg-3 mg/3 ml Soln] 3 ml INHALATION 

QID PRN


     PRN Reason: Dyspnea


   EPINEPHrine [Epipen 2-Alf] 0.3 mg IM ONCE PRN #1 ml


     PRN Reason: Anaphylaxis


   Desvenlafaxine Succinate [Pristiq] 100 mg PO DAILY


   QUEtiapine [SEROquel] 100 mg PO HS


   Atorvastatin [Lipitor] 20 mg PO HS


   buPROPion HCL [Wellbutrin XL] 300 mg PO DAILY


Discharge Medication List





Albuterol Inhaler [Ventolin Inhaler] 2 puff INHALATION Q6HR PRN 07/12/14 

[History]


Ipratropium/Albuterol Sulfate [Duoneb 0.5 mg-3 mg/3 ml Soln] 3 ml INHALATION QID

PRN 07/12/14 [History]


lisinopriL [Zestril] 10 mg PO HS 07/12/14 [History]


EPINEPHrine [Epipen 2-Alf] 0.3 mg IM ONCE PRN #1 ml 07/13/14 [Rx]


Atorvastatin [Lipitor] 20 mg PO HS 12/29/21 [History]


Desvenlafaxine Succinate [Pristiq] 100 mg PO DAILY 12/29/21 [History]


QUEtiapine [SEROquel] 100 mg PO HS 12/29/21 [History]


buPROPion HCL [Wellbutrin XL] 300 mg PO DAILY 12/29/21 [History]


Apixaban [Eliquis] 2.5 mg PO BID #60 tab 10/15/22 [Rx]


Docusate [Colace] 100 mg PO DAILY #30 capsule 10/15/22 [Rx]


HYDROcodone/APAP 7.5-325MG [Norco 7.5] 1 - 2 each PO Q6HR PRN #36 tab 10/15/22 

[Rx]








Follow up Appointment(s)/Referral(s): 


Colin Monroe PAC [PHYSICIAN ASSISTANT] - 2 Weeks


Patient Instructions/Handouts:  Knee Replacement (DC)


Activity/Diet/Wound Care/Special Instructions: 


Discharge instructions:


1.  Wound care and infection precautions, keep incision dry and covered while 

showering, no lotions, creams, moisturizers. No soaking, tubs, pools, hottubs. 

Do not scrub over the incision.


2.  Weight-bear as tolerated with walker / cane until follow-up.


3.  Ice and elevate when necessary.  Do not exceed 20 minutes per hour with ice 

pack.


4.  Utilize compression sleeve until seen at first follow up appointment.


5.  Visiting nursing care.


6.  Home physical therapy including home CPM.


7.  Pain meds and anticoagulants per prescription.


8.  Pain medication has potential to cause constipation. Increase oral fluid and

fiber intake. Contact primary care provider if you have not had a bowel movement

within 48 hours after discharge


9.  No anti-inflammatory medication until discussed at first post operative 

visit, this including Motrin, Aleve, Mobic, Diclofenac.


10.  Follow up in office at 2 weeks postop with Mateus Chandler PA-C / Colin Monroe PA-C


11. Follow up with your primary care doctor 7-10 days after discharge.


12. Contact [facility] with any questions, [phone].





Keep incision clean, dry, intact.  While showering, cover fusion tape with Saran

wrap.  Keep fusion tape on until follow-up appointment in office in 2 weeks





Medications: Norco; Colace; Eliquis 2.5 mg BID x 2 weeks.


Discharge Disposition: HOME WITH HOME HEALTH SERVICES

## 2024-08-13 ENCOUNTER — HOSPITAL ENCOUNTER (EMERGENCY)
Dept: HOSPITAL 47 - EC | Age: 64
Discharge: HOME | End: 2024-08-13
Payer: MEDICARE

## 2024-08-13 DIAGNOSIS — M54.16: Primary | ICD-10-CM

## 2024-08-13 PROCEDURE — 96372 THER/PROPH/DIAG INJ SC/IM: CPT

## 2024-08-13 PROCEDURE — 99283 EMERGENCY DEPT VISIT LOW MDM: CPT

## 2024-08-24 ENCOUNTER — HOSPITAL ENCOUNTER (INPATIENT)
Dept: HOSPITAL 47 - 4SSUR | Age: 64
LOS: 2 days | Discharge: HOME | DRG: 390 | End: 2024-08-26
Attending: INTERNAL MEDICINE | Admitting: INTERNAL MEDICINE
Payer: MEDICARE

## 2024-08-24 DIAGNOSIS — Z79.899: ICD-10-CM

## 2024-08-24 DIAGNOSIS — Z91.030: ICD-10-CM

## 2024-08-24 DIAGNOSIS — K52.9: ICD-10-CM

## 2024-08-24 DIAGNOSIS — F32.A: ICD-10-CM

## 2024-08-24 DIAGNOSIS — Z88.1: ICD-10-CM

## 2024-08-24 DIAGNOSIS — E78.5: ICD-10-CM

## 2024-08-24 DIAGNOSIS — F41.9: ICD-10-CM

## 2024-08-24 DIAGNOSIS — Z88.0: ICD-10-CM

## 2024-08-24 DIAGNOSIS — K56.600: Primary | ICD-10-CM

## 2024-08-24 DIAGNOSIS — I10: ICD-10-CM

## 2024-08-24 DIAGNOSIS — K56.7: ICD-10-CM

## 2024-08-24 DIAGNOSIS — Z88.8: ICD-10-CM

## 2024-08-24 PROCEDURE — 96374 THER/PROPH/DIAG INJ IV PUSH: CPT

## 2024-08-24 PROCEDURE — 74177 CT ABD & PELVIS W/CONTRAST: CPT

## 2024-08-24 PROCEDURE — 96361 HYDRATE IV INFUSION ADD-ON: CPT

## 2024-08-24 PROCEDURE — 96376 TX/PRO/DX INJ SAME DRUG ADON: CPT

## 2024-08-24 PROCEDURE — 80053 COMPREHEN METABOLIC PANEL: CPT

## 2024-08-24 PROCEDURE — 83735 ASSAY OF MAGNESIUM: CPT

## 2024-08-24 PROCEDURE — 87040 BLOOD CULTURE FOR BACTERIA: CPT

## 2024-08-24 PROCEDURE — 99285 EMERGENCY DEPT VISIT HI MDM: CPT

## 2024-08-24 PROCEDURE — 85025 COMPLETE CBC W/AUTO DIFF WBC: CPT

## 2024-08-24 PROCEDURE — 93005 ELECTROCARDIOGRAM TRACING: CPT

## 2024-08-25 VITALS — RESPIRATION RATE: 17 BRPM

## 2024-08-25 LAB
ALBUMIN SERPL-MCNC: 3.2 G/DL (ref 3.5–5)
ALBUMIN/GLOB SERPL: 1.5 {RATIO}
ALP SERPL-CCNC: 74 U/L (ref 38–126)
ALT SERPL-CCNC: 26 U/L (ref 4–49)
ANION GAP SERPL CALC-SCNC: 2 MMOL/L
AST SERPL-CCNC: 26 U/L (ref 17–59)
BASOPHILS # BLD AUTO: 0 K/UL (ref 0–0.2)
BASOPHILS NFR BLD AUTO: 0 %
BUN SERPL-SCNC: 20 MG/DL (ref 9–20)
CALCIUM SPEC-MCNC: 8 MG/DL (ref 8.4–10.2)
CHLORIDE SERPL-SCNC: 105 MMOL/L (ref 98–107)
CO2 SERPL-SCNC: 25 MMOL/L (ref 22–30)
EOSINOPHIL # BLD AUTO: 0.1 K/UL (ref 0–0.7)
EOSINOPHIL NFR BLD AUTO: 2 %
ERYTHROCYTE [DISTWIDTH] IN BLOOD BY AUTOMATED COUNT: 4.05 M/UL (ref 4.3–5.9)
ERYTHROCYTE [DISTWIDTH] IN BLOOD: 12.9 % (ref 11.5–15.5)
GLOBULIN SER CALC-MCNC: 2.2 G/DL
GLUCOSE BLD-MCNC: 73 MG/DL (ref 70–110)
GLUCOSE SERPL-MCNC: 66 MG/DL (ref 74–99)
HCT VFR BLD AUTO: 37.2 % (ref 39–53)
HGB BLD-MCNC: 12.5 GM/DL (ref 13–17.5)
LYMPHOCYTES # SPEC AUTO: 1.6 K/UL (ref 1–4.8)
LYMPHOCYTES NFR SPEC AUTO: 27 %
MAGNESIUM SPEC-SCNC: 2.1 MG/DL (ref 1.6–2.3)
MCH RBC QN AUTO: 30.7 PG (ref 25–35)
MCHC RBC AUTO-ENTMCNC: 33.5 G/DL (ref 31–37)
MCV RBC AUTO: 91.7 FL (ref 80–100)
MONOCYTES # BLD AUTO: 0.5 K/UL (ref 0–1)
MONOCYTES NFR BLD AUTO: 7 %
NEUTROPHILS # BLD AUTO: 3.8 K/UL (ref 1.3–7.7)
NEUTROPHILS NFR BLD AUTO: 62 %
PLATELET # BLD AUTO: 166 K/UL (ref 150–450)
POTASSIUM SERPL-SCNC: 4.1 MMOL/L (ref 3.5–5.1)
PROT SERPL-MCNC: 5.4 G/DL (ref 6.3–8.2)
SODIUM SERPL-SCNC: 132 MMOL/L (ref 137–145)
WBC # BLD AUTO: 6.1 K/UL (ref 3.8–10.6)

## 2024-08-25 RX ADMIN — DESVENLAFAXINE SUCCINATE SCH MG: 50 TABLET, EXTENDED RELEASE ORAL at 10:38

## 2024-08-25 RX ADMIN — IBUPROFEN PRN MG: 400 TABLET, FILM COATED ORAL at 20:30

## 2024-08-25 RX ADMIN — CEFAZOLIN SCH: 330 INJECTION, POWDER, FOR SOLUTION INTRAMUSCULAR; INTRAVENOUS at 04:57

## 2024-08-25 RX ADMIN — ATORVASTATIN CALCIUM SCH MG: 20 TABLET, FILM COATED ORAL at 20:33

## 2024-08-25 RX ADMIN — BUPROPION HYDROCHLORIDE SCH MG: 300 TABLET, FILM COATED, EXTENDED RELEASE ORAL at 08:02

## 2024-08-25 RX ADMIN — ENOXAPARIN SODIUM SCH MG: 40 INJECTION SUBCUTANEOUS at 08:03

## 2024-08-25 RX ADMIN — METRONIDAZOLE SCH: 500 INJECTION, SOLUTION INTRAVENOUS at 04:57

## 2024-08-25 RX ADMIN — HYDROMORPHONE HYDROCHLORIDE PRN MG: 1 INJECTION, SOLUTION INTRAMUSCULAR; INTRAVENOUS; SUBCUTANEOUS at 05:02

## 2024-08-25 RX ADMIN — CIPROFLOXACIN SCH MLS/HR: 2 INJECTION, SOLUTION INTRAVENOUS at 08:02

## 2024-08-25 NOTE — P.PN
Subjective


Progress Note Date: 08/25/24





Hospital course:





Patient is a very pleasant 64-year-old male who presented to the hospital with a

chief complaint of abdominal pain.  He underwent evaluation in the emergency 

department.  CT abdomen and pelvis revealed abnormal loops of small bowel 

concerning for small bowel obstruction.  Patient was started on Flagyl 500 mg 

IVPB and Cipro 400 mg every 12 hours IVPB.  He was admitted under our services 

with consultation to general surgery.





Physical exam:





Patient was seen and fully evaluated at bedside this morning.  Patient sitting 

on the edge of bed reports he is doing a bit better today.  Patient reports 

abdominal pain is improving and he is passing flatus.  He denies having a bowel 

movement since arrival to our facility.  Patient denies having any nausea, 

vomiting, or any other complaints at this time.  He is tolerating clear liquid 

diet.





Vital signs reviewed and stable. 


General: Nontoxic, no distress and appears stated age.  


Derm: Skin warm and dry, normal coloration for ethnicity.


Head: Atraumatic, normocephalic and symmetric.  


Eyes: EOM's intact, no lid lag, and anicteric sclera


Mouth: no lip lesions, mucus membranes moist


Cardiovascular: regular rate and rhythm with normal S1S2, no murmur, positive 

posterior tibial pulses bilaterally, and cap refill < 2 seconds.  


Lungs: Respirations even, regular, and unlabored on room air. Lungs CTA 

bilaterally, no rhonchi, no rales, no wheezing, and no accessory muscle usage. 


Abdominal: Abdomen is soft distended, bowel sounds present in all 4 quadrants.  

Mild diffuse tenderness reported upon palpation.


Ext: ROM intact. No gross muscle atrophy, no edema, no contractures


Neuro: Speech clear, face symmetrical and CN II-XII grossly intact with no noted

focal neuro deficits


Psych: Alert and oriented to person, place, time, and situation. Appropriate and

pleasant affect. 





Assessment and Plan of Care:





Small bowel obstruction versus ileus, appears to be improving


Colitis


Abdominal pain, secondary to above


Hypertension


Hyperlipidemia


Anxiety





 General Surgery following, discussed plan with Dr. Medina. 


 Continue IV antibiotics with Flagyl 500 mg every 8 hours and ciprofloxacin 400

mg every 12 hours.


 Continue symptomatic treatment and pain management with Dilaudid.


 Antiemetics with Zofran.


 Full liquid diet, advance only per general surgery recommendations.





Hypertension


 Continue lisinopril 10 mg daily.





Hyperlipidemia


 Continue daily medication regimen with atorvastatin 20 mg nightly, 





Anxiety and depression


Continue Wellbutrin 300 mg daily, Pristiq 100 mg daily, and Seroquel 100 mg 

nightly.





CODE STATUS: Full code


DVT prophylaxis: Lovenox


Anticipated discharge date: Pending clinical course


Anticipated discharge place: Home


Patient was seen independently by Nurse Pracitioner.


This document was prepared using Dragon dictation software.  Please allow for 

errors in transcription, while rare they do occur.





Kumar Hodge NP rendered care for this patient independently, reviewed the 

findings and plan as documented in the note above.  I did not physically speak 

with or examine the patient on this date. 





Objective





- Vital Signs


Vital signs: 


                                   Vital Signs











Temp  97.4 F L  08/25/24 07:23


 


Pulse  94   08/25/24 07:23


 


Resp  17   08/25/24 07:23


 


BP  162/88   08/25/24 07:23


 


Pulse Ox  97   08/25/24 07:23


 


FiO2      








                                 Intake & Output











 08/24/24 08/25/24 08/25/24





 18:59 06:59 18:59


 


Weight  94.5 kg 














- Labs


CBC & Chem 7: 


                                 08/25/24 02:52





                                 08/25/24 03:00


Labs: 


                  Abnormal Lab Results - Last 24 Hours (Table)











  08/25/24 08/25/24 Range/Units





  02:52 03:00 


 


RBC  4.05 L   (4.30-5.90)  m/uL


 


Hgb  12.5 L   (13.0-17.5)  gm/dL


 


Hct  37.2 L   (39.0-53.0)  %


 


Sodium   132 L  (137-145)  mmol/L


 


Glucose   66 L  (74-99)  mg/dL


 


Calcium   8.0 L  (8.4-10.2)  mg/dL


 


Total Protein   5.4 L  (6.3-8.2)  g/dL


 


Albumin   3.2 L  (3.5-5.0)  g/dL

## 2024-08-25 NOTE — P.PN
Subjective


Progress Note Date: 08/25/24





Patient feels better.  He has minimal complaints of pain.  He has had some 

flatus.  He has not had a bowel movement.





On exam vital signs are stable.  Abdomen is soft.  There is minimal distention.





Patient most likely has resolving ileus or PSBO.  He will start on full liquids.





Objective





- Vital Signs


Vital signs: 


                                   Vital Signs











Temp  98.2 F   08/25/24 02:40


 


Pulse  82   08/25/24 02:40


 


Resp  17   08/25/24 02:40


 


BP  163/89   08/25/24 02:40


 


Pulse Ox  98   08/25/24 02:40


 


FiO2      








                                 Intake & Output











 08/24/24 08/25/24 08/25/24





 18:59 06:59 18:59


 


Weight  94.5 kg 














- Labs


CBC & Chem 7: 


                                 08/25/24 02:52





                                 08/25/24 03:00


Labs: 


                  Abnormal Lab Results - Last 24 Hours (Table)











  08/25/24 08/25/24 Range/Units





  02:52 03:00 


 


RBC  4.05 L   (4.30-5.90)  m/uL


 


Hgb  12.5 L   (13.0-17.5)  gm/dL


 


Hct  37.2 L   (39.0-53.0)  %


 


Sodium   132 L  (137-145)  mmol/L


 


Glucose   66 L  (74-99)  mg/dL


 


Calcium   8.0 L  (8.4-10.2)  mg/dL


 


Total Protein   5.4 L  (6.3-8.2)  g/dL


 


Albumin   3.2 L  (3.5-5.0)  g/dL

## 2024-08-26 VITALS — TEMPERATURE: 98.1 F | DIASTOLIC BLOOD PRESSURE: 88 MMHG | SYSTOLIC BLOOD PRESSURE: 156 MMHG | HEART RATE: 86 BPM

## 2024-08-26 LAB
GLUCOSE BLD-MCNC: 111 MG/DL (ref 70–110)
GLUCOSE BLD-MCNC: 115 MG/DL (ref 70–110)

## 2024-08-26 RX ADMIN — KETOROLAC TROMETHAMINE ONE: 15 INJECTION, SOLUTION INTRAMUSCULAR; INTRAVENOUS at 11:25

## 2024-08-26 RX ADMIN — IBUPROFEN ONE: 400 TABLET, FILM COATED ORAL at 11:24

## 2024-08-26 RX ADMIN — HYDROMORPHONE HYDROCHLORIDE ONE: 1 INJECTION, SOLUTION INTRAMUSCULAR; INTRAVENOUS; SUBCUTANEOUS at 11:24

## 2024-08-26 RX ADMIN — KETOROLAC TROMETHAMINE PRN MG: 15 INJECTION, SOLUTION INTRAMUSCULAR; INTRAVENOUS at 00:13

## 2024-08-26 NOTE — P.DS
Providers


Date of admission: 


08/24/24 02:11





Expected date of discharge: 08/26/24


Attending physician: 


Martin Jean MD





Consults: 





                                        





08/24/24 19:25


Consult Physician Routine 


   Consulting Provider: Virgil Medina


   Consult Reason/Comments: small bowel obstruction vs ileus


   Do you want consulting provider notified?: Already Contacted











Primary care physician: 


Stated None





Hospital Course: 





Discharge Diagnosis:





Small bowel obstruction, resolved after treatment with conservative measures.





Abdominal pain, secondary to above





Hypertension





Hyperlipidemia





Anxiety





Hypertension. Continue lisinopril 10 mg daily.





Hyperlipidemia. Continue daily medication regimen with atorvastatin 20 mg 

nightly, 





Anxiety and depression. Continue Wellbutrin 300 mg daily, Pristiq 100 mg daily, 

and Seroquel 100 mg nightly.





Hospital Course: 





Patient is a very pleasant 64-year-old male who presented to the hospital with a

chief complaint of abdominal pain.  He underwent evaluation in the emergency 

department.  CT abdomen and pelvis revealed abnormal loops of small bowel 

concerning for small bowel obstruction.  Patient was started on Flagyl 500 mg 

IVPB and Cipro 400 mg every 12 hours IVPB.  He was admitted under our services 

with consultation to general surgery.  Patient was treated with conservative 

measures.  Abdominal pain/distention slowly improving.  Patient began passing 

flatus and has now had a bowel movement.  Small bowel obstruction resolved.  

Patient tolerating regular diet, cleared from general surgery perspective and is

medically stable for discharge home at this time.  Patient discharged home on 

MiraLAX 17 g daily





Physical exam:





Vital signs reviewed and sta.ble. 


General: Nontoxic, no distress and appears stated age.  


Derm: Skin warm and dry, normal coloration for ethnicity.


Head: Atraumatic, normocephalic and symmetric.  


Eyes: EOM's intact, no lid lag, and anicteric sclera


Mouth: no lip lesions, mucus membranes moist


Cardiovascular: regular rate and rhythm with normal S1S2, no murmur, positive 

posterior tibial pulses bilaterally, and cap refill < 2 seconds.  


Lungs: Respirations even, regular, and unlabored on room air. Lungs CTA 

bilaterally, no rhonchi, no rales, no wheezing, and no accessory muscle usage. 


Abdominal: Abdomen is soft distended, bowel sounds present in all 4 quadrants.  

No tenderness upon palpation.


Ext: ROM intact. No gross muscle atrophy, no edema, no contractures


Neuro: Speech clear, face symmetrical and CN II-XII grossly intact with no noted

focal neuro deficits


Psych: Alert and oriented to person, place, time, and situation. Appropriate and

pleasant affect. 








A total of 34 minutes of time were spent preparing this complex discharge 

summary.


Pt was discharged on 8/26/2024 at 2:58 PM.


Patient was seen independently by Nurse Practitioner.


This document was prepared using Dragon dictation software.  Please allow for 

errors in transcription while rare they do occur.





Kumar Hodge NP rendered care for this patient independently, reviewed the 

findings and plan as documented in the note above.  I did not physically speak 

with or examine the patient on this date.


Patient Condition at Discharge: Stable





Plan - Discharge Summary


New Discharge Prescriptions: 


New


   polyethylene glycoL 3350 [Miralax] 17 gm PO DAILY 30 Days #30 packet





Continue


   Albuterol Inhaler [Ventolin Hfa Inhaler] 2 puff INHALATION Q6HR PRN


     PRN Reason: Shortness Of Breath


   lisinopriL [Zestril] 10 mg PO HS


   Ipratropium/Albuterol Sulfate [Duoneb 0.5 mg-3 mg/3 ml Soln] 3 ml INHALATION 

QID PRN


     PRN Reason: Dyspnea


   EPINEPHrine [Epipen 2-Alf] 0.3 mg IM ONCE PRN #1 ml


     PRN Reason: Anaphylaxis


   Desvenlafaxine Succinate [Pristiq] 100 mg PO DAILY


   Docusate [Colace] 100 mg PO DAILY #30 capsule


   QUEtiapine [SEROquel] 100 mg PO HS


   Atorvastatin [Lipitor] 20 mg PO HS


   buPROPion HCL [Wellbutrin XL] 300 mg PO DAILY


   Apixaban [Eliquis] 2.5 mg PO BID #60 tab


   HYDROcodone/APAP 7.5-325MG [Norco 7.5-325] 1 - 2 each PO Q6HR PRN #36 tab


     PRN Reason: Pain


Discharge Medication List





Albuterol Inhaler [Ventolin Hfa Inhaler] 2 puff INHALATION Q6HR PRN 07/12/14 

[History]


Ipratropium/Albuterol Sulfate [Duoneb 0.5 mg-3 mg/3 ml Soln] 3 ml INHALATION QID

PRN 07/12/14 [History]


lisinopriL [Zestril] 10 mg PO HS 07/12/14 [History]


EPINEPHrine [Epipen 2-Alf] 0.3 mg IM ONCE PRN #1 ml 07/13/14 [Rx]


Atorvastatin [Lipitor] 20 mg PO HS 12/29/21 [History]


Desvenlafaxine Succinate [Pristiq] 100 mg PO DAILY 12/29/21 [History]


QUEtiapine [SEROquel] 100 mg PO HS 12/29/21 [History]


buPROPion HCL [Wellbutrin XL] 300 mg PO DAILY 12/29/21 [History]


Apixaban [Eliquis] 2.5 mg PO BID #60 tab 10/15/22 [Rx]


Docusate [Colace] 100 mg PO DAILY #30 capsule 10/15/22 [Rx]


HYDROcodone/APAP 7.5-325MG [Norco 7.5-325] 1 - 2 each PO Q6HR PRN #36 tab 

10/15/22 [Rx]


polyethylene glycoL 3350 [Miralax] 17 gm PO DAILY 30 Days #30 packet 08/26/24 

[Rx]








Follow up Appointment(s)/Referral(s): 


Sindy Rashid MD [REFERRING] - 1-2 Days (Please call to schedule first available 

appointment with residency clinic for follow up visit and to establish care with

 PCP)


Virgil Medina MD [STAFF PHYSICIAN] - 1 Week (Office is closed at time of 

discharge. Please call for follow-up apppointment.)


Patient Instructions/Handouts:  High Fiber Diet (DC), Bowel Obstruction (DC)


Discharge Disposition: HOME SELF-CARE

## 2024-08-26 NOTE — P.PN
Subjective


Progress Note Date: 08/26/24





CHIEF COMPLAINT: Abdominal pain





HISTORY OF PRESENT ILLNESS: Patient reports he is feeling better.  He is 

tolerating Full liquid diet.  No nausea or vomiting.  Afebrile.  BP elevated.





Patient seen and examined with Dr. Medina





PHYSICAL EXAM: 


VITAL SIGNS: Reviewed.


GENERAL: Well-developed in no acute distress. 


ABDOMEN:  Soft.  Nondistended. Nontender. 


NEUROLOGIC: Alert and oriented. Cranial nerves II through XII grossly intact.





ASSESSMENT: 


1.  Resolving ileus or partial small bowel obstruction








PLAN: 


-Patient can be discharged from surgical standpoint


-Continue full liquid diet and advance as tolerated


-Encourage patient to increase activity level


-Recommend follow-up with Dr. Medina in office in 1 week





Physician Assistant note has been reviewed by physician. Signing provider agrees

with the documented findings, assessment, and plan of care.





Objective





- Vital Signs


Vital signs: 


                                   Vital Signs











Temp  97.3 F L  08/26/24 08:05


 


Pulse  88   08/26/24 08:05


 


Resp  17   08/26/24 08:05


 


BP  177/98   08/26/24 08:05


 


Pulse Ox  96   08/26/24 08:05


 


FiO2      








                                 Intake & Output











 08/25/24 08/26/24 08/26/24





 18:59 06:59 18:59


 


Intake Total   250


 


Balance   250


 


Intake:   


 


  Oral   250


 


Other:   


 


  # Voids 3 2 














- Labs


CBC & Chem 7: 


                                 08/25/24 02:52





                                 08/25/24 03:00


Labs: 


                  Abnormal Lab Results - Last 24 Hours (Table)











  08/26/24 Range/Units





  06:36 


 


POC Glucose (mg/dL)  111 H  ()  mg/dL

## 2024-09-14 ENCOUNTER — HOSPITAL ENCOUNTER (INPATIENT)
Dept: HOSPITAL 47 - EC | Age: 64
LOS: 3 days | Discharge: HOME | DRG: 390 | End: 2024-09-17
Attending: FAMILY MEDICINE | Admitting: FAMILY MEDICINE
Payer: MEDICARE

## 2024-09-14 DIAGNOSIS — Z79.899: ICD-10-CM

## 2024-09-14 DIAGNOSIS — K56.609: Primary | ICD-10-CM

## 2024-09-14 DIAGNOSIS — F32.A: ICD-10-CM

## 2024-09-14 DIAGNOSIS — J44.9: ICD-10-CM

## 2024-09-14 DIAGNOSIS — I10: ICD-10-CM

## 2024-09-14 DIAGNOSIS — Z87.891: ICD-10-CM

## 2024-09-14 DIAGNOSIS — Z90.49: ICD-10-CM

## 2024-09-14 DIAGNOSIS — E78.5: ICD-10-CM

## 2024-09-14 LAB
ALBUMIN SERPL-MCNC: 4.7 G/DL (ref 3.5–5)
ALP SERPL-CCNC: 96 U/L (ref 38–126)
ALT SERPL-CCNC: 41 U/L (ref 4–49)
AMYLASE SERPL-CCNC: 41 U/L (ref 30–110)
ANION GAP SERPL CALC-SCNC: 10 MMOL/L
AST SERPL-CCNC: 40 U/L (ref 17–59)
BASOPHILS # BLD AUTO: 0.1 K/UL (ref 0–0.2)
BASOPHILS NFR BLD AUTO: 1 %
BUN SERPL-SCNC: 15 MG/DL (ref 9–20)
CALCIUM SPEC-MCNC: 9.7 MG/DL (ref 8.4–10.2)
CHLORIDE SERPL-SCNC: 102 MMOL/L (ref 98–107)
CO2 SERPL-SCNC: 26 MMOL/L (ref 22–30)
EOSINOPHIL # BLD AUTO: 0.1 K/UL (ref 0–0.7)
EOSINOPHIL NFR BLD AUTO: 1 %
ERYTHROCYTE [DISTWIDTH] IN BLOOD BY AUTOMATED COUNT: 5.01 M/UL (ref 4.3–5.9)
ERYTHROCYTE [DISTWIDTH] IN BLOOD: 12.7 % (ref 11.5–15.5)
GLUCOSE SERPL-MCNC: 92 MG/DL (ref 74–99)
HCT VFR BLD AUTO: 44.6 % (ref 39–53)
HGB BLD-MCNC: 15.1 GM/DL (ref 13–17.5)
LIPASE SERPL-CCNC: 93 U/L (ref 23–300)
LYMPHOCYTES # SPEC AUTO: 1.8 K/UL (ref 1–4.8)
LYMPHOCYTES NFR SPEC AUTO: 21 %
MAGNESIUM SPEC-SCNC: 2.4 MG/DL (ref 1.6–2.3)
MCH RBC QN AUTO: 30.1 PG (ref 25–35)
MCHC RBC AUTO-ENTMCNC: 33.9 G/DL (ref 31–37)
MCV RBC AUTO: 88.9 FL (ref 80–100)
MONOCYTES # BLD AUTO: 0.7 K/UL (ref 0–1)
MONOCYTES NFR BLD AUTO: 8 %
NEUTROPHILS # BLD AUTO: 5.8 K/UL (ref 1.3–7.7)
NEUTROPHILS NFR BLD AUTO: 67 %
PH UR: 8.5 [PH] (ref 5–8)
PLATELET # BLD AUTO: 329 K/UL (ref 150–450)
POTASSIUM SERPL-SCNC: 5.1 MMOL/L (ref 3.5–5.1)
PROT SERPL-MCNC: 7.9 G/DL (ref 6.3–8.2)
PROT UR QL: (no result)
RBC UR QL: 1 /HPF (ref 0–5)
SODIUM SERPL-SCNC: 138 MMOL/L (ref 137–145)
SP GR UR: 1.02 (ref 1–1.03)
SQUAMOUS UR QL AUTO: 1 /HPF (ref 0–4)
UROBILINOGEN UR QL STRIP: <2 MG/DL (ref ?–2)
WBC # BLD AUTO: 8.6 K/UL (ref 3.8–10.6)
WBC #/AREA URNS HPF: 1 /HPF (ref 0–5)

## 2024-09-14 PROCEDURE — 74176 CT ABD & PELVIS W/O CONTRAST: CPT

## 2024-09-14 PROCEDURE — 83735 ASSAY OF MAGNESIUM: CPT

## 2024-09-14 PROCEDURE — 96374 THER/PROPH/DIAG INJ IV PUSH: CPT

## 2024-09-14 PROCEDURE — 83690 ASSAY OF LIPASE: CPT

## 2024-09-14 PROCEDURE — 51798 US URINE CAPACITY MEASURE: CPT

## 2024-09-14 PROCEDURE — 85025 COMPLETE CBC W/AUTO DIFF WBC: CPT

## 2024-09-14 PROCEDURE — 74177 CT ABD & PELVIS W/CONTRAST: CPT

## 2024-09-14 PROCEDURE — 81001 URINALYSIS AUTO W/SCOPE: CPT

## 2024-09-14 PROCEDURE — 36415 COLL VENOUS BLD VENIPUNCTURE: CPT

## 2024-09-14 PROCEDURE — 99285 EMERGENCY DEPT VISIT HI MDM: CPT

## 2024-09-14 PROCEDURE — 80053 COMPREHEN METABOLIC PANEL: CPT

## 2024-09-14 PROCEDURE — 83605 ASSAY OF LACTIC ACID: CPT

## 2024-09-14 PROCEDURE — 82150 ASSAY OF AMYLASE: CPT

## 2024-09-14 RX ADMIN — HYDROMORPHONE HYDROCHLORIDE STA MG: 1 INJECTION, SOLUTION INTRAMUSCULAR; INTRAVENOUS; SUBCUTANEOUS at 15:59

## 2024-09-14 RX ADMIN — HYDROMORPHONE HYDROCHLORIDE PRN MG: 1 INJECTION, SOLUTION INTRAMUSCULAR; INTRAVENOUS; SUBCUTANEOUS at 19:36

## 2024-09-14 RX ADMIN — CEFAZOLIN SCH MLS/HR: 330 INJECTION, POWDER, FOR SOLUTION INTRAMUSCULAR; INTRAVENOUS at 23:46

## 2024-09-14 RX ADMIN — ENOXAPARIN SODIUM SCH MG: 40 INJECTION SUBCUTANEOUS at 22:29

## 2024-09-14 RX ADMIN — PANTOPRAZOLE SODIUM SCH MG: 40 INJECTION, POWDER, FOR SOLUTION INTRAVENOUS at 22:29

## 2024-09-14 NOTE — ED
Abdominal Pain HPI





- General


Chief Complaint: Abdominal Pain


Stated Complaint: post-op abd pain


Time Seen by Provider: 09/14/24 15:24


Source: patient, family, RN notes reviewed


Mode of arrival: ambulatory


Limitations: no limitations





- History of Present Illness


Initial Comments: 


64-year-old male presents emergency department accompanied by his wife chief 

complaint of diffuse abdominal pain over the past few days.  Patient states that

he believes he was constipated and took magnesium citrate at home yesterday with

minimal relief.  Patient states episode of diarrhea this morning and is still 

passing gas.  He states that he has been having difficulty with initiating his 

urine stream.  Denies chest pain, shortness of breath, heart palpitations, dizzi

ness lightheadedness.  Denies hematochezia, dark or tarry stools, urinary 

changes.  Patient was admitted to the hospital in August of 2024 with a small 

bowel obstruction and discharged home.








- Related Data


                                Home Medications











 Medication  Instructions  Recorded  Confirmed


 


Albuterol Inhaler [Ventolin Hfa 2 puff INHALATION RT-Q6H PRN 07/12/14 09/14/24





Inhaler]   


 


Atorvastatin [Lipitor] 20 mg PO HS 12/29/21 09/14/24


 


Desvenlafaxine Succinate [Pristiq] 100 mg PO DAILY 12/29/21 09/14/24


 


QUEtiapine [SEROquel] 100 mg PO HS 12/29/21 09/14/24


 


buPROPion HCL [Wellbutrin XL] 300 mg PO DAILY 12/29/21 09/14/24


 


Lisinopril-Hctz 20-12.5 mg 1 tab PO HS 09/14/24 09/14/24





[Zestoretic 20-12.5]   








                                  Previous Rx's











 Medication  Instructions  Recorded


 


EPINEPHrine [Epipen 2-Alf] 0.3 mg IM ONCE PRN #1 ml 07/13/14


 


polyethylene glycoL 3350 [Miralax] 17 gm PO DAILY 30 Days #30 packet 08/26/24











                                    Allergies











Allergy/AdvReac Type Severity Reaction Status Date / Time


 


amlodipine besylate Allergy  Unknown Verified 09/14/24 18:19





[From Shayan]     


 


budesonide [From Pulmicort] Allergy  Unknown Verified 09/14/24 18:19


 


dyclonine HCl [From Sucrets] Allergy  shortness Verified 09/14/24 18:19





   of breath  


 


hexylresorcinol Allergy  Unknown Verified 09/14/24 18:19





[From Sucrets]     


 


olmesartan medoxomil Allergy  Unknown Verified 09/14/24 18:19





[From Shayan]     


 


penicillin V potassium Allergy  Unknown Verified 09/14/24 18:19





[From Pen-Vee K]   Childhood  


 


venom-honey bee Allergy  Unknown Verified 09/14/24 18:19





[bee venom (honey bee)]     














Review of Systems


ROS Statement: 


Those systems with pertinent positive or pertinent negative responses have been 

documented in the HPI.





ROS Other: All systems not noted in ROS Statement are negative.





Past Medical History


Past Medical History: Asthma, COPD, Hyperlipidemia, Hypertension, Osteoarthritis

(OA)


Additional Past Medical History / Comment(s): Heart diseaseleft bundle branch 

block


History of Any Multi-Drug Resistant Organisms: None Reported


Past Surgical History: Heart Catheterization, Orthopedic Surgery


Additional Past Surgical History / Comment(s): rt hand trigger thumb, rt carpal 

tunnel, lump removed from neck, ganglion cyst removed right foot


Past Anesthesia/Blood Transfusion Reactions: No Reported Reaction


Past Psychological History: Depression


Smoking Status: Former smoker


Past Alcohol Use History: Occasional


Past Drug Use History: None Reported





- Past Family History


  ** Mother


Family Medical History: Hyperlipidemia





General Exam


Limitations: no limitations


General appearance: alert, in no apparent distress


Head exam: Present: atraumatic, normocephalic, normal inspection


ENT exam: Present: normal exam, mucous membranes moist


Neck exam: Present: normal inspection.  Absent: tenderness, meningismus, lym

phadenopathy


Respiratory exam: Present: normal lung sounds bilaterally.  Absent: respiratory 

distress, wheezes, rales, rhonchi, stridor


Cardiovascular Exam: Present: regular rate, normal rhythm, normal heart sounds. 

Absent: systolic murmur, diastolic murmur, rubs, gallop, clicks


GI/Abdominal exam: Present: soft, distended, tenderness (diffuse), normal bowel 

sounds.  Absent: guarding, rebound, rigid


Extremities exam: Present: normal inspection, full ROM, normal capillary refill.

 Absent: tenderness, pedal edema, joint swelling, calf tenderness


Back exam: Present: normal inspection


Skin exam: Present: warm, dry, intact, normal color.  Absent: rash





Course


                                   Vital Signs











  09/14/24 09/14/24 09/14/24





  15:14 15:44 17:48


 


Temperature 98.1 F  


 


Pulse Rate 109 H 105 H 99


 


Respiratory 20 20 20





Rate   


 


Blood Pressure 141/95 144/93 143/94


 


O2 Sat by Pulse 98 99 96





Oximetry   














Medical Decision Making





- Medical Decision Making


Was pt. sent in by a medical professional or institution ([facility], NP, urgent 

care, hospital, or nursing home...) When possible be specific


@ -No


Did you speak to anyone other than the patient for history (EMS, parent, family,

police, friend...)? What history was obtained from this source 


@ -Spoke to the patient's at the bedside states the patient been having 

abdominal pain over the past few days it has been worsening.


Did you review nursing and triage notes (agree or disagree)? Why? 


@ -I reviewed and agree with nursing and triage notes


Were old charts reviewed (outside hosp., previous admission, EMS record, old 

EKG, old radiological studies, urgent care reports/EKG's, nursing home records)?

Report findings 


@ -Reviewed patient's previous emergency department visit note where he was 

admitted to the hospital for small bowel obstruction


Differential Diagnosis (chest pain, altered mental status, abdominal pain women,

abdominal pain men, vaginal bleeding, weakness, fever, dyspnea, syncope, 

headache, dizziness, GI bleed, back pain, seizure, CVA, palpatations, mental 

health, musculoskeletal)? 


@ -Differential Abdominal Pain Men:


Appendicitis, cholecystitis, diverticulosis, ischemic bowel, pancreatitis, 

hepatitis, UTI, gastroenteritis, AAA, incarcerated hernia, bowel obstruction, 

constipation, inflammatory bowel, hepatitis, peptic ulcer disease, splenic 

infarction, perforated viscus, testicular torsion, this is not meant to be an 

all-inclusive list





EKG interpreted by me (3pts min.).


@ -none


X-rays interpreted by me (1pt min.).


@ -None done


CT interpreted by me (1pt min.).


@ -CT abdomen pelvis with IV contrast reveals mid abdominal small bowel 

obstruction with couple continuous small loops of bowel dilated up to 4.1 cm, 

concern for possibility of closed-loop obstruction, sigmoid diverticulosis 

without diverticulitis.


U/S interpreted by me (1pt. min.).


@ -None done


What testing was considered but not performed or refused? (CT, X-rays, U/S, lab

s)? Why?


@ -None


What meds were considered but not given or refused? Why?


@ -None


Did you discuss the management of the patient with other professionals 

(professionals i.e. , PA, NP, lab, RT, psych nurse, , , 

teacher, , )? Give summary


@ -I spoke with internal medicine physician, Dr. Hui, with sound 

physicians in regard to the patient's presentation and CT scan concerning for 

SBO, patient will be admitted and kept as NPO and under pain control with 

general surgery on consult.


Was smoking cessation discussed for >3mins.?


@ -No


Was critical care preformed (if so, how long)?


@ -No


Were there social determinants of health that impacted care today? How? 

(Homelessness, low income, unemployed, alcoholism, drug addiction, 

transportation, low edu. Level, literacy, decrease access to med. care, retirement, 

rehab)?


@ -No


Was there de-escalation of care discussed even if they declined (Discuss DNR or 

withdrawal of care, Hospice)? DNR status


@ -No


What co-morbidities impacted this encounter? (DM, HTN, Smoking, COPD, CAD, 

Cancer, CVA, ARF, Chemo, Hep., AIDS, mental health diagnosis, sleep apnea, 

morbid obesity)?


@ -None


Was patient admitted / discharged? Hospital course, mention meds given and 

route, prescriptions, significant lab abnormalities, going to OR and other 

pertinent info.


@ -64-year-old male with abdominal pain.  On examination patient is resting 

comfortably no signs of acute distress.  Abdominal examination remarkable for 

distended abdomen with diffuse tenderness on palpation.  Bowel sounds are equal 

and reactive.  Patient is provided with pain medication pending results of CT 

imaging and labs, he is in agreement with this plan. CBC, CMP within normal 

limits, and urinalysis no signs of infection.  CT pending for small bowel 

obstruction.  Patient will be admitted to medicine with surgery on consult and 

kept n.p.o. and will be provided with pain medication as needed.  Discussed with

Dr. Novak.


Undiagnosed new problem with uncertain prognosis?


@ -No


Drug Therapy requiring intensive monitoring for toxicity (Heparin, Nitro, 

Insulin, Cardizem)?


@ -No


Were any procedures done?


@ -No


Diagnosis/symptom?


@ -small bowel obstruction


Acute, or Chronic, or Acute on Chronic?


@ -Acute


Uncomplicated (without systemic symptoms) or Complicated (systemic symptoms)?


@ -Uncomplicated


Side effects of treatment?


@ -No


Exacerbation, Progression, or Severe Exacerbation?


@ -No


Poses a threat to life or bodily function? How? (Chest pain, USA, MI, pneumonia,

PE, COPD, DKA, ARF, appy, cholecystitis, CVA, Diverticulitis, Homicidal, 

Suicidal, threat to staff... and all critical care pts)


@ -No








- Lab Data


Result diagrams: 


                                 09/14/24 15:44





                                 09/14/24 15:44


                                   Lab Results











  09/14/24 09/14/24 09/14/24 Range/Units





  15:44 15:44 15:44 


 


WBC  8.6    (3.8-10.6)  k/uL


 


RBC  5.01    (4.30-5.90)  m/uL


 


Hgb  15.1    (13.0-17.5)  gm/dL


 


Hct  44.6    (39.0-53.0)  %


 


MCV  88.9    (80.0-100.0)  fL


 


MCH  30.1    (25.0-35.0)  pg


 


MCHC  33.9    (31.0-37.0)  g/dL


 


RDW  12.7    (11.5-15.5)  %


 


Plt Count  329    (150-450)  k/uL


 


MPV  7.8    


 


Neutrophils %  67    %


 


Lymphocytes %  21    %


 


Monocytes %  8    %


 


Eosinophils %  1    %


 


Basophils %  1    %


 


Neutrophils #  5.8    (1.3-7.7)  k/uL


 


Lymphocytes #  1.8    (1.0-4.8)  k/uL


 


Monocytes #  0.7    (0-1.0)  k/uL


 


Eosinophils #  0.1    (0-0.7)  k/uL


 


Basophils #  0.1    (0-0.2)  k/uL


 


Sodium   138   (137-145)  mmol/L


 


Potassium   5.1   (3.5-5.1)  mmol/L


 


Chloride   102   ()  mmol/L


 


Carbon Dioxide   26   (22-30)  mmol/L


 


Anion Gap   10   mmol/L


 


BUN   15   (9-20)  mg/dL


 


Creatinine   0.98   (0.66-1.25)  mg/dL


 


Est GFR (CKD-EPI)AfAm   >90   (>60 ml/min/1.73 sqM)  


 


Est GFR (CKD-EPI)NonAf   81   (>60 ml/min/1.73 sqM)  


 


Glucose   92   (74-99)  mg/dL


 


Plasma Lactic Acid Hang    1.5  (0.7-2.0)  mmol/L


 


Calcium   9.7   (8.4-10.2)  mg/dL


 


Magnesium   2.4 H   (1.6-2.3)  mg/dL


 


Total Bilirubin   1.3   (0.2-1.3)  mg/dL


 


AST   40   (17-59)  U/L


 


ALT   41   (4-49)  U/L


 


Alkaline Phosphatase   96   ()  U/L


 


Total Protein   7.9   (6.3-8.2)  g/dL


 


Albumin   4.7   (3.5-5.0)  g/dL


 


Amylase   41   ()  U/L


 


Lipase   93   ()  U/L


 


Urine Color     


 


Urine Appearance     (Clear)  


 


Urine pH     (5.0-8.0)  


 


Ur Specific Gravity     (1.001-1.035)  


 


Urine Protein     (Negative)  


 


Urine Glucose (UA)     (Negative)  


 


Urine Ketones     (Negative)  


 


Urine Blood     (Negative)  


 


Urine Nitrite     (Negative)  


 


Urine Bilirubin     (Negative)  


 


Urine Urobilinogen     (<2.0)  mg/dL


 


Ur Leukocyte Esterase     (Negative)  


 


Urine RBC     (0-5)  /hpf


 


Urine WBC     (0-5)  /hpf


 


Ur Squamous Epith Cells     (0-4)  /hpf


 


Urine Mucus     (None)  /hpf














  09/14/24 Range/Units





  16:47 


 


WBC   (3.8-10.6)  k/uL


 


RBC   (4.30-5.90)  m/uL


 


Hgb   (13.0-17.5)  gm/dL


 


Hct   (39.0-53.0)  %


 


MCV   (80.0-100.0)  fL


 


MCH   (25.0-35.0)  pg


 


MCHC   (31.0-37.0)  g/dL


 


RDW   (11.5-15.5)  %


 


Plt Count   (150-450)  k/uL


 


MPV   


 


Neutrophils %   %


 


Lymphocytes %   %


 


Monocytes %   %


 


Eosinophils %   %


 


Basophils %   %


 


Neutrophils #   (1.3-7.7)  k/uL


 


Lymphocytes #   (1.0-4.8)  k/uL


 


Monocytes #   (0-1.0)  k/uL


 


Eosinophils #   (0-0.7)  k/uL


 


Basophils #   (0-0.2)  k/uL


 


Sodium   (137-145)  mmol/L


 


Potassium   (3.5-5.1)  mmol/L


 


Chloride   ()  mmol/L


 


Carbon Dioxide   (22-30)  mmol/L


 


Anion Gap   mmol/L


 


BUN   (9-20)  mg/dL


 


Creatinine   (0.66-1.25)  mg/dL


 


Est GFR (CKD-EPI)AfAm   (>60 ml/min/1.73 sqM)  


 


Est GFR (CKD-EPI)NonAf   (>60 ml/min/1.73 sqM)  


 


Glucose   (74-99)  mg/dL


 


Plasma Lactic Acid Hang   (0.7-2.0)  mmol/L


 


Calcium   (8.4-10.2)  mg/dL


 


Magnesium   (1.6-2.3)  mg/dL


 


Total Bilirubin   (0.2-1.3)  mg/dL


 


AST   (17-59)  U/L


 


ALT   (4-49)  U/L


 


Alkaline Phosphatase   ()  U/L


 


Total Protein   (6.3-8.2)  g/dL


 


Albumin   (3.5-5.0)  g/dL


 


Amylase   ()  U/L


 


Lipase   ()  U/L


 


Urine Color  Yellow  


 


Urine Appearance  Cloudy  (Clear)  


 


Urine pH  8.5 H  (5.0-8.0)  


 


Ur Specific Gravity  1.020  (1.001-1.035)  


 


Urine Protein  1+ H  (Negative)  


 


Urine Glucose (UA)  Negative  (Negative)  


 


Urine Ketones  Trace H  (Negative)  


 


Urine Blood  Negative  (Negative)  


 


Urine Nitrite  Negative  (Negative)  


 


Urine Bilirubin  Negative  (Negative)  


 


Urine Urobilinogen  <2.0  (<2.0)  mg/dL


 


Ur Leukocyte Esterase  Negative  (Negative)  


 


Urine RBC  1  (0-5)  /hpf


 


Urine WBC  1  (0-5)  /hpf


 


Ur Squamous Epith Cells  1  (0-4)  /hpf


 


Urine Mucus  Many H  (None)  /hpf














Disposition


Clinical Impression: 


 Small bowel obstruction





Disposition: ADMITTED AS IP TO THIS [facility]


Condition: Stable


Is patient prescribed a controlled substance at d/c from ED?: No


Decision to Admit Reason: Admit from EC


Decision Date: 09/14/24


Decision Time: 18:00

## 2024-09-14 NOTE — P.HPIM
History of Present Illness


H&P Date: 09/14/24


Chief Complaint: abd pain , constipation


Patient is a 64-year male with COPD (not on home O2), hypertension, 

hyperlipidemia that presents to the ED with abdominal pain.





Patient reports that his abdominal pain started yesterday morning after he woke 

up, and states the pain waxes and wanes in severity throughout the day, but 

never fully resolved.  Describes it as a diffuse, achy,  pain with no radiation.

 Rates it an 8/10 pain at its worst.  no associated nausea or vomiting, no GI 

bleeding. He reports his last bowel movement was 4 days ago, and has not been 

passing gasses. He stated he was here 3 weeks ago with similar symptoms.  While 

here he got a CT abdomen that was negative and was discharged shortly after.  

Patient states he has not eaten in the past 3 days.  





Patient denies any fever, chills, chest pain, nausea, vomiting, shortness of 

breath.  Admits to diaphoresis, abdominal distention.





denies any surgical history to the abdomen, denies any similar conditions in the

past.








Review of systems:


Pertinent positives and negatives as discussed in HPI, a complete review of 

systems was performed and all other systems are negative.





Social history:


Tobacco: Former 37 years 2 pack a day smoker, quit 15 years ago


Alcohol: Occasional


Recreational drugs: Denies illicit drug use


Travel: Denies recent travel


Occupation: Not obtained





Physical examination:


Vitals: T97.7 F, CA 98, RR 16, /84, O2 sat 94% on room air


General: non toxic, no distress, appears at stated age, normal weight


Derm: no unusual rashes/lesions, warm


Head: atraumatic, normocephalic, symmetric


Eyes: EOMI, anicteric sclera, pupils equal round reactive to light


ENT: Nose and ears atraumatic


Mouth: no lip lesion, mucus membranes moist


Cardiovascular: S1S2 reg, no murmur, positive dorsalis pedis pulse bilateral, no

edema


Lungs: CTA bilateral, no rhonchi, no rales, no accessory muscle use


Abdominal: Mildly distended, diffuse tenderness to palpation, no guarding, BS 

positive


Ext: muscle strength 5 out of 5 in all 4 extremities grossly, no gross muscle 

atrophy, no contractures,


Neuro:  CN II-XI grossly intact, no gross focal neuro deficits


Psych: Alert, oriented, appropriate affect





Assessment/Plan:


Patient is a 64-year male with COPD (not on home O2), hypertension, 

hyperlipidemia that presents to the ED with abdominal pain. ED documentation 

reviewed and case discussed with ED provider. Discussed with patient.  I 

accepted the admission with an anticipated greater than 2 midnight stay for 

evaluation of abdominal pain secondary to suspected mid abdominal small bowel 

obstruction.





#.  Abdominal pain secondary to suspected mid abdominal small bowel obstruction


obstipation


 Denies any recent abdominal surgery


 Last bowel movement 4 days ago


 CBC unremarkable, AST 40, ALT 41, bilirubin 1.3, alk phos 96 lipase 93, lactic

acid 1.5 all within normal limit


 Abdominal CT suspicious for mid abdominal small bowel obstruction, couple 

contiguous loops of small bowel dilated up to 4.1 cm, 


   may be 2 associated transition points, mildly large lymph node in area measur

ing 1 cm, sigmoid diverticulosis without acute diverticulitis


 Keep patient NPO


 Protonix 40 mg IVP BID


 Pain management:  Dilaudid 1 mg IVP every 3 hours are as needed


 Follow-up CBC, CMP


 Consult surgery


IVF hydration with normal saline @ 130 cc per hour 





#.  COPD (not on home O2), not in exacerbation


 No signs of wheezing, shortness of breath, cough


 O2 Sat 94% on room air


 DuoNebs as needed





#.  Hypertension


 Continue lisinoprilHCTZ 20-12.5 mg 1 tab p.o. at bedtime





#.  Hyperlipidemia


 Continue atorvastatin 20 mg p.o. at bedtime





#.  Anxiety/depression


 Continue Wellbutrin  mg p.o. daily


 Continue desvenlafaxine succinate 100 mg p.o. daily


 Continue Seroquel 100 mg p.o. at bedtime





F: N/A


E: Replete as needed


N: NPO


A: Patient ambulatory





DVT prophylaxis: Lovenox 40 SQ daily


CODE STATUS: Full





Past Medical History


Past Medical History: Asthma, COPD, Hyperlipidemia, Hypertension, Osteoarthritis

(OA)


Additional Past Medical History / Comment(s): Heart diseaseleft bundle branch 

block


History of Any Multi-Drug Resistant Organisms: None Reported


Past Surgical History: Heart Catheterization, Orthopedic Surgery


Additional Past Surgical History / Comment(s): rt hand trigger thumb, rt carpal 

tunnel, lump removed from neck, ganglion cyst removed right foot


Past Anesthesia/Blood Transfusion Reactions: No Reported Reaction


Past Psychological History: Depression


Smoking Status: Former smoker


Past Alcohol Use History: Occasional


Past Drug Use History: None Reported





- Past Family History


  ** Mother


Family Medical History: Hyperlipidemia





Medications and Allergies


                                Home Medications











 Medication  Instructions  Recorded  Confirmed  Type


 


Albuterol Inhaler [Ventolin Hfa 2 puff INHALATION RT-Q6H PRN 07/12/14 09/14/24 

History





Inhaler]    


 


EPINEPHrine [Epipen 2-Alf] 0.3 mg IM ONCE PRN #1 ml 07/13/14 09/14/24 Rx


 


Atorvastatin [Lipitor] 20 mg PO HS 12/29/21 09/14/24 History


 


Desvenlafaxine Succinate [Pristiq] 100 mg PO DAILY 12/29/21 09/14/24 History


 


QUEtiapine [SEROquel] 100 mg PO HS 12/29/21 09/14/24 History


 


buPROPion HCL [Wellbutrin XL] 300 mg PO DAILY 12/29/21 09/14/24 History


 


polyethylene glycoL 3350 [Miralax] 17 gm PO DAILY 30 Days #30 packet 08/26/24 09/14/24 Rx


 


Lisinopril-Hctz 20-12.5 mg 1 tab PO HS 09/14/24 09/14/24 History





[Zestoretic 20-12.5]    








                                    Allergies











Allergy/AdvReac Type Severity Reaction Status Date / Time


 


amlodipine besylate Allergy  Unknown Verified 09/14/24 18:19





[From Shayan]     


 


budesonide [From Pulmicort] Allergy  Unknown Verified 09/14/24 18:19


 


dyclonine HCl [From Sucrets] Allergy  shortness Verified 09/14/24 18:19





   of breath  


 


hexylresorcinol Allergy  Unknown Verified 09/14/24 18:19





[From Sucrets]     


 


olmesartan medoxomil Allergy  Unknown Verified 09/14/24 18:19





[From Shayan]     


 


penicillin V potassium Allergy  Unknown Verified 09/14/24 18:19





[From Pen-Vee K]   Childhood  


 


venom-honey bee Allergy  Unknown Verified 09/14/24 18:19





[bee venom (honey bee)]     














Physical Exam


Vitals: 


                                   Vital Signs











  Temp Pulse Resp BP Pulse Ox


 


 09/14/24 19:30   94  18  152/85  98


 


 09/14/24 17:48   99  20  143/94  96


 


 09/14/24 15:44   105 H  20  144/93  99


 


 09/14/24 15:14  98.1 F  109 H  20  141/95  98








                                Intake and Output











 09/14/24 09/14/24 09/14/24





 06:59 14:59 22:59


 


Other:   


 


  Weight   93.894 kg














Results


CBC & Chem 7: 


                                 09/14/24 15:44





                                 09/14/24 15:44


Labs: 


                  Abnormal Lab Results - Last 24 Hours (Table)











  09/14/24 09/14/24 Range/Units





  15:44 16:47 


 


Magnesium  2.4 H   (1.6-2.3)  mg/dL


 


Urine pH   8.5 H  (5.0-8.0)  


 


Urine Protein   1+ H  (Negative)  


 


Urine Ketones   Trace H  (Negative)  


 


Urine Mucus   Many H  (None)  /hpf














Assessment and Plan


Assessment: 








I have seen and evaluated the patient today. I Discussed the case with the 

resident and agree with the resident's findings I edited the assessment and  

plan as necessary as documented in the resident's note.

## 2024-09-14 NOTE — CT
EXAMINATION TYPE: CT abdomen pelvis w con

 

DATE OF EXAM: 9/14/2024

 

COMPARISON: 8/23/2024

 

HISTORY: 64-year-old male Abdominal pain

 

TECHNIQUE: Contiguous axial scanning of the abdomen and pelvis following administration of 100 ml Omn
ipaque with diffuse Isovue 300 IV contrast.  Delayed images through the kidneys and coronal/sagittal 
reconstructions performed.

 

CT DLP: 978.5 mGycm

Automated exposure control for dose reduction was used.

 

 

FINDINGS: 

 

Heart normal size without pericardial effusion. Lung bases clear without pleural effusion.

 

Liver borderline enlarged at 17.4 cm. No focal lesion. No biliary ductal dilatation. Portal venous sy
stem is patent.

 

Gallbladder, adrenal glands, left kidney, spleen with tiny anterior splenule, pancreas within normal 
limits. Tiny 8 mm cortical cyst anterior mid right kidney. Symmetric uptake and excretion of contrast
 from both kidneys.

 

Also moderate atherosclerotic calcifications infrarenal abdominal aorta without aneurysm.

 

A couple contiguous loops of mid small bowel dilated up to 4.1 cm with air-fluid levels. Possible 2 t
ransition points, axial image 51 and 59. Coronal image 34. No free fluid or free air.

 

There appears to be a prominent lymph node here measuring nearly 1 cm, probably reactive.

 

No free fluid or free air.

 

No significant stool burden. Sigmoid diverticulosis. No pericolonic inflammatory change.

 

Mild circumferential bladder wall thickening may be chronic for the patient. Prostate gland is border
line at 4.1 cm wide. No abnormal fluid collection in the pelvis or pelvic lymphadenopathy.

 

IMPRESSION:

1. Suspicious for a mid abdominal small bowel obstruction. A couple contiguous loops of small bowel h
ere are dilated up to 4.1 cm. There may be 2 associated transition points which raises the possibilit
y of a closed loop obstruction.

2. A mildly enlarged lymph node measuring nearly 1 cm in this region is probably reactive. Consider f
ollow-up CT in 3 months to ensure involution.

3. Sigmoid diverticulosis without acute diverticulitis.

 

[facility], Workstation: [id number], 9/14/2024 5:39 PM

## 2024-09-15 LAB
ALBUMIN SERPL-MCNC: 3.8 G/DL (ref 3.8–4.9)
ALBUMIN/GLOB SERPL: 1.73 RATIO (ref 1.6–3.17)
ALP SERPL-CCNC: 103 U/L (ref 41–126)
ALT SERPL-CCNC: 29 U/L (ref 10–49)
ANION GAP SERPL CALC-SCNC: 10.8 MMOL/L (ref 4–12)
AST SERPL-CCNC: 20 U/L (ref 14–35)
BASOPHILS # BLD AUTO: 0.03 X 10*3/UL (ref 0–0.1)
BASOPHILS NFR BLD AUTO: 0.5 %
BUN SERPL-SCNC: 14.8 MG/DL (ref 9–27)
BUN/CREAT SERPL: 14.8 RATIO (ref 12–20)
CALCIUM SPEC-MCNC: 8.6 MG/DL (ref 8.7–10.3)
CHLORIDE SERPL-SCNC: 106 MMOL/L (ref 96–109)
CO2 SERPL-SCNC: 22.2 MMOL/L (ref 21.6–31.8)
EOSINOPHIL # BLD AUTO: 0.11 X 10*3/UL (ref 0.04–0.35)
EOSINOPHIL NFR BLD AUTO: 1.9 %
ERYTHROCYTE [DISTWIDTH] IN BLOOD BY AUTOMATED COUNT: 4.39 X 10*6/UL (ref 4.4–5.6)
ERYTHROCYTE [DISTWIDTH] IN BLOOD: 12.5 % (ref 11.5–14.5)
GLOBULIN SER CALC-MCNC: 2.2 G/DL (ref 1.6–3.3)
GLUCOSE SERPL-MCNC: 86 MG/DL (ref 70–110)
HCT VFR BLD AUTO: 39.3 % (ref 39.6–50)
HGB BLD-MCNC: 13.3 G/DL (ref 13–17)
IMM GRANULOCYTES BLD QL AUTO: 0.3 %
LYMPHOCYTES # SPEC AUTO: 2.23 X 10*3/UL (ref 0.9–5)
LYMPHOCYTES NFR SPEC AUTO: 38.1 %
MAGNESIUM SPEC-SCNC: 2.2 MG/DL (ref 1.5–2.4)
MCH RBC QN AUTO: 30.3 PG (ref 27–32)
MCHC RBC AUTO-ENTMCNC: 33.8 G/DL (ref 32–37)
MCV RBC AUTO: 89.5 FL (ref 80–97)
MONOCYTES # BLD AUTO: 0.7 X 10*3/UL (ref 0.2–1)
MONOCYTES NFR BLD AUTO: 11.9 %
NEUTROPHILS # BLD AUTO: 2.77 X 10*3/UL (ref 1.8–7.7)
NEUTROPHILS NFR BLD AUTO: 47.3 %
NRBC BLD AUTO-RTO: 0 X 10*3/UL (ref 0–0.01)
PLATELET # BLD AUTO: 238 X 10*3/UL (ref 140–440)
POTASSIUM SERPL-SCNC: 4.3 MMOL/L (ref 3.5–5.5)
PROT SERPL-MCNC: 6 G/DL (ref 6.2–8.2)
SODIUM SERPL-SCNC: 139 MMOL/L (ref 135–145)
WBC # BLD AUTO: 5.86 X 10*3/UL (ref 4.5–10)

## 2024-09-15 RX ADMIN — DESVENLAFAXINE SUCCINATE SCH MG: 50 TABLET, EXTENDED RELEASE ORAL at 10:12

## 2024-09-15 RX ADMIN — ATORVASTATIN CALCIUM SCH MG: 20 TABLET, FILM COATED ORAL at 21:30

## 2024-09-15 RX ADMIN — LISINOPRIL AND HYDROCHLOROTHIAZIDE SCH EACH: 12.5; 2 TABLET ORAL at 21:30

## 2024-09-15 RX ADMIN — BUPROPION HYDROCHLORIDE SCH MG: 300 TABLET, FILM COATED, EXTENDED RELEASE ORAL at 10:13

## 2024-09-15 NOTE — P.GSCN
History of Present Illness


Consult date: 09/15/24


Reason for Consult: 





Small bowel obstruction


History of present illness: 





This is a [age]-year-old male who is admitted to hospital with complaints of 

nausea abdominal pain.  Patient states that overnight he has had several bowel 

movements and is passing gas.  He states he has no abdominal pain.





Past Medical History


Past Medical History: Asthma, COPD, Hyperlipidemia, Hypertension, Osteoarthritis

(OA)


Additional Past Medical History / Comment(s): Heart diseaseleft bundle branch 

block


History of Any Multi-Drug Resistant Organisms: None Reported


Past Surgical History: Heart Catheterization, Orthopedic Surgery


Additional Past Surgical History / Comment(s): rt hand trigger thumb, rt carpal 

tunnel, lump removed from neck, ganglion cyst removed right foot


Past Anesthesia/Blood Transfusion Reactions: No Reported Reaction


Past Psychological History: Depression


Smoking Status: Former smoker


Past Alcohol Use History: Occasional


Past Drug Use History: None Reported





- Past Family History


  ** Mother


Family Medical History: Hyperlipidemia





Medications and Allergies


                                Home Medications











 Medication  Instructions  Recorded  Confirmed  Type


 


Albuterol Inhaler [Ventolin Hfa 2 puff INHALATION RT-Q6H PRN 07/12/14 09/14/24 

History





Inhaler]    


 


EPINEPHrine [Epipen 2-Alf] 0.3 mg IM ONCE PRN #1 ml 07/13/14 09/14/24 Rx


 


Atorvastatin [Lipitor] 20 mg PO HS 12/29/21 09/14/24 History


 


Desvenlafaxine Succinate [Pristiq] 100 mg PO DAILY 12/29/21 09/14/24 History


 


QUEtiapine [SEROquel] 100 mg PO HS 12/29/21 09/14/24 History


 


buPROPion HCL [Wellbutrin XL] 300 mg PO DAILY 12/29/21 09/14/24 History


 


polyethylene glycoL 3350 [Miralax] 17 gm PO DAILY 30 Days #30 packet 08/26/24 09/14/24 Rx


 


Lisinopril-Hctz 20-12.5 mg 1 tab PO HS 09/14/24 09/14/24 History





[Zestoretic 20-12.5]    








                                    Allergies











Allergy/AdvReac Type Severity Reaction Status Date / Time


 


amlodipine besylate Allergy  Unknown Verified 09/14/24 18:19





[From Shayan]     


 


budesonide [From Pulmicort] Allergy  Unknown Verified 09/14/24 18:19


 


dyclonine HCl [From Sucrets] Allergy  shortness Verified 09/14/24 18:19





   of breath  


 


hexylresorcinol Allergy  Unknown Verified 09/14/24 18:19





[From Sucrets]     


 


olmesartan medoxomil Allergy  Unknown Verified 09/14/24 18:19





[From Shayan]     


 


penicillin V potassium Allergy  Unknown Verified 09/14/24 18:19





[From Pen-Vee K]   Childhood  


 


venom-honey bee Allergy  Unknown Verified 09/14/24 18:19





[bee venom (honey bee)]     














Surgical - Exam


                                   Vital Signs











Temp Pulse Resp BP Pulse Ox


 


 98.1 F   109 H  20   141/95   98 


 


 09/14/24 15:14  09/14/24 15:14  09/14/24 15:14  09/14/24 15:14  09/14/24 15:14














- General


well developed, well nourished, no distress





- Eyes


PERRL





- ENT


normal pinna





- Neck


no masses





- Respiratory


normal expansion





- Cardiovascular


Rhythm: regular





- Abdomen


Abdomen: soft, non tender





Results





- Labs





                                 09/15/24 03:36





                                 09/14/24 15:44


                  Abnormal Lab Results - Last 24 Hours (Table)











  09/14/24 09/14/24 09/15/24 Range/Units





  15:44 16:47 03:36 


 


RBC    4.39 L  (4.40-5.60)  X 10*6/uL


 


Hct    39.3 L  (39.6-50.0)  %


 


Magnesium  2.4 H    (1.6-2.3)  mg/dL


 


Urine pH   8.5 H   (5.0-8.0)  


 


Urine Protein   1+ H   (Negative)  


 


Urine Ketones   Trace H   (Negative)  


 


Urine Mucus   Many H   (None)  /hpf








                                 Diabetes panel











  09/14/24 Range/Units





  15:44 


 


Sodium  138  (137-145)  mmol/L


 


Potassium  5.1  (3.5-5.1)  mmol/L


 


Chloride  102  ()  mmol/L


 


Carbon Dioxide  26  (22-30)  mmol/L


 


BUN  15  (9-20)  mg/dL


 


Creatinine  0.98  (0.66-1.25)  mg/dL


 


Glucose  92  (74-99)  mg/dL


 


Calcium  9.7  (8.4-10.2)  mg/dL


 


AST  40  (17-59)  U/L


 


ALT  41  (4-49)  U/L


 


Alkaline Phosphatase  96  ()  U/L


 


Total Protein  7.9  (6.3-8.2)  g/dL


 


Albumin  4.7  (3.5-5.0)  g/dL








                                  Calcium panel











  09/14/24 Range/Units





  15:44 


 


Calcium  9.7  (8.4-10.2)  mg/dL


 


Albumin  4.7  (3.5-5.0)  g/dL








                                 Pituitary panel











  09/14/24 Range/Units





  15:44 


 


Sodium  138  (137-145)  mmol/L


 


Potassium  5.1  (3.5-5.1)  mmol/L


 


Chloride  102  ()  mmol/L


 


Carbon Dioxide  26  (22-30)  mmol/L


 


BUN  15  (9-20)  mg/dL


 


Creatinine  0.98  (0.66-1.25)  mg/dL


 


Glucose  92  (74-99)  mg/dL


 


Calcium  9.7  (8.4-10.2)  mg/dL








                                  Adrenal panel











  09/14/24 Range/Units





  15:44 


 


Sodium  138  (137-145)  mmol/L


 


Potassium  5.1  (3.5-5.1)  mmol/L


 


Chloride  102  ()  mmol/L


 


Carbon Dioxide  26  (22-30)  mmol/L


 


BUN  15  (9-20)  mg/dL


 


Creatinine  0.98  (0.66-1.25)  mg/dL


 


Glucose  92  (74-99)  mg/dL


 


Calcium  9.7  (8.4-10.2)  mg/dL


 


Total Bilirubin  1.3  (0.2-1.3)  mg/dL


 


AST  40  (17-59)  U/L


 


ALT  41  (4-49)  U/L


 


Alkaline Phosphatase  96  ()  U/L


 


Total Protein  7.9  (6.3-8.2)  g/dL


 


Albumin  4.7  (3.5-5.0)  g/dL














Assessment and Plan


Assessment: 





Resolved ileus/partial small bowel obstruction.  Patient will be started on 

clear liquid diet.  No surgical invention is planned.

## 2024-09-15 NOTE — P.PN
Subjective


Progress Note Date: 09/15/24





Hospital Course: 


64-year-old male with history of COPD, hypertension, dyslipidemia presenting w

ith small bowel obstruction.  Vital signs, laboratory workup unremarkable.  CT 

abdomen pelvis shows mid abdominal small bowel obstruction, possible transition 

point, mildly enlarged lymph node measuring nearly 1 cm in the region, likely 

reactive, sigmoid diverticulosis.  Neurosurgery consulted.








Subjective: 


Patient seen and examined at bedside.  No acute events overnight.  Abdominal 

pain slightly improved.  Having bowel movements.





Pertinent positives and negatives as discussed above, a complete review of 

systems was performed and all other systems are negative.








Vitals Signs Reviewed. 





General: Nontoxic, no distress, appears at stated age


Derm: Warm, dry


Head: Atraumatic, normocephalic, symmetric


Eyes: EOMI, no lid lag, anicteric sclera


Mouth: No lip lesion, mucus membranes moist


Cardiovascular: S1S2 reg, no murmur


Lungs: CTA bilateral, no rhonchi, no rales, no accessory muscle use


Abdominal: Soft, nontender to palpation, no guarding, no appreciable 

organomegaly


Ext: No gross muscle atrophy, no edema, no contractures


Neuro: CN II-XI grossly intact, no focal neuro deficits


Psych: Alert, oriented, appropriate affect





Data Reviewed Today: 


Pertinent Labs: WBC 5.86, hemoglobin 13.3, platelet 238, creatinine 1


Imaging: No new imaging





Assessment and Plan:


Active:


Small bowel obstruction


-General Surgery note reviewed, no surgical intervention, started on clear 

liquid diet


-Pain control with IV Dilaudid as needed


-Continue Protonix 40 IV twice daily





Hypertension


-Continue lisinopril-hydrochlorothiazide 20-12.5 daily





Depression/mood disorder


-Wellbutrin 300 mg daily, desvenlafaxine 100 mg daily


-Seroquel 100 mg nightly





Dyslipidemia


-Lipitor 20 nightly





COPD, without exacerbation


-Continue albuterol every 6 hours as needed








DVT ppx: Lovenox





Code status: Full code





Anticipated discharge place: Home


Anticipated discharge time: likely tomorrow








Objective





- Vital Signs


Vital signs: 


                                   Vital Signs











Temp  97.8 F   09/15/24 12:51


 


Pulse  72   09/15/24 12:51


 


Resp  18   09/15/24 12:51


 


BP  134/78   09/15/24 12:51


 


Pulse Ox  96   09/15/24 12:51


 


FiO2      








                                 Intake & Output











 09/14/24 09/15/24 09/15/24





 18:59 06:59 18:59


 


Intake Total  910 540


 


Balance  910 540


 


Weight 93.894 kg 93.894 kg 


 


Intake:   


 


  Intake, IV Titration  910 





  Amount   


 


    Sodium Chloride 0.9% 1,  910 





    000 ml @ 130 mls/hr IV .   





    [id number] [facility] Rx#:[id number]   


 


  Oral   540


 


Other:   


 


  Voiding Method   Toilet














- Labs


CBC & Chem 7: 


                                 09/15/24 03:36





                                 09/15/24 03:36


Labs: 


                  Abnormal Lab Results - Last 24 Hours (Table)











  09/14/24 09/14/24 09/15/24 Range/Units





  15:44 16:47 03:36 


 


RBC    4.39 L  (4.40-5.60)  X 10*6/uL


 


Hct    39.3 L  (39.6-50.0)  %


 


Calcium     (8.7-10.3)  mg/dL


 


Magnesium  2.4 H    (1.6-2.3)  mg/dL


 


Total Protein     (6.2-8.2)  g/dL


 


Urine pH   8.5 H   (5.0-8.0)  


 


Urine Protein   1+ H   (Negative)  


 


Urine Ketones   Trace H   (Negative)  


 


Urine Mucus   Many H   (None)  /hpf














  09/15/24 Range/Units





  03:36 


 


RBC   (4.40-5.60)  X 10*6/uL


 


Hct   (39.6-50.0)  %


 


Calcium  8.6 L  (8.7-10.3)  mg/dL


 


Magnesium   (1.6-2.3)  mg/dL


 


Total Protein  6.0 L  (6.2-8.2)  g/dL


 


Urine pH   (5.0-8.0)  


 


Urine Protein   (Negative)  


 


Urine Ketones   (Negative)  


 


Urine Mucus   (None)  /hpf

## 2024-09-16 VITALS — RESPIRATION RATE: 16 BRPM

## 2024-09-16 RX ADMIN — IOPAMIDOL PRN ML: 612 INJECTION, SOLUTION INTRAVENOUS at 11:59

## 2024-09-16 NOTE — CT
EXAMINATION TYPE: CT abdomen pelvis wo con

 

DATE OF EXAM: 9/16/2024

 

COMPARISON: 9/14/2024

 

HISTORY: 64-year-old male increase abd pain and nausea

 

CT DLP: 599.2 mGycm. Automated exposure control for dose reduction was used.

 

TECHNIQUE: Contiguous axial scanning of the abdomen and pelvis without IV contrast. Coronal and sagit
jude reconstructions performed. 

 

FINDINGS: 

Heart normal size without pericardial effusion. Lung bases clear without pleural effusion.

 

Tiny hiatal hernia.

 

Noncontrast appearance of the liver, adrenal glands, kidneys, spleen with tiny anterior splenule, and
 pancreas within normal limits. Some high density material within the nondistended gallbladder, proba
jeanette vicarious excretion of contrast and from the biliary system.a

 

A couple borderline to mildly enlarged gastrohepatic ligament lymph nodes measuring up to 1.1 cm.

 

Minimal residual wall thickening of bowel within the right paramedian anterior mid abdomen, axial geoff
ge 54 and 50. Adjacent mesenteric nodule measuring 1.2 cm there are underlying lesion here is not exc
luded. The bowel obstruction seen previously has resolved.

 

No other mesenteric or retroperitoneal adenopathy seen.

 

Mild stool burden. There is lower descending and sigmoid colonic diverticulosis. No pericolonic infla
mmatory change.

 

Circumferential bladder wall thickening. Prostate gland borderline in size at 4.0 cm wide. No abnorma
l fluid collection in the pelvis or pelvic lymphadenopathy.

 

Mild degenerative change of the hips. There is a left L5 hemisacralization.

 

Moderate degenerative disc disease L2-L3 and L4-L5. Facet arthropathy mid to lower lumbar spine. Dish
 lower thoracic spine.

 

 

 

IMPRESSION: 

1.  Interval resolution of the patient's recent small bowel obstruction. Some minimal residual reacti
ve small bowel wall thickening may remain in the anterior right mid abdomen at the site of previous o
bstruction.

2.  In addition, there is possible underlying mesenteric soft tissue lesion measuring 1.2 cm, axial i
mage 50. Recommend careful, close CT surveillance follow-up. If any progressive enlarging soft tissue
, the differential would include lymphoma, carcinoid tumor, and mesenteric desmoid as some possibilit
ies.

3.  Sigmoid diverticulosis without acute diverticulitis.

4.  Circumferential bladder wall thickening. Correlate to exclude cystitis.

 

 

 

[facility], Workstation: [id number], 9/16/2024 2:02 PM

## 2024-09-16 NOTE — P.PN
Subjective


Progress Note Date: 09/16/24





CHIEF COMPLAINT: Abdominal pain





HISTORY OF PRESENT ILLNESS: Patient does complain of right sided abdominal pain 

and nausea after his clear liquid breakfast this morning.  He is having flatus. 

Denies any bowel movement.  He has been having issues with constipation.  

Afebrile.  WBC 5.86





Patient seen and examined with Dr. Medina





PHYSICAL EXAM: 


VITAL SIGNS: Reviewed.


GENERAL: Well-developed in no acute distress. 


ABDOMEN:  Soft.  Nondistended.  Mild tenderness right side of the abdomen


NEUROLOGIC: Alert and oriented. Cranial nerves II through XII grossly intact.





ASSESSMENT: 


1.  Possible partial small bowel obstruction versus ileus


2.  Prior history of appendectomy





PLAN: 


-CT scan abdomen pelvis with oral contrast ordered for further evaluation of 

patient's abdominal pain and nausea


-Keep patient n.p.o. until CT scan results reviewed


-Encourage patient to ambulate


-Zofran as needed every 8 hours ordered for nausea








Physician Assistant note has been reviewed by physician. Signing provider agrees

with the documented findings, assessment, and plan of care.





Objective





- Vital Signs


Vital signs: 


                                   Vital Signs











Temp  97.4 F L  09/16/24 07:50


 


Pulse  70   09/16/24 07:50


 


Resp  16   09/16/24 07:50


 


BP  157/75   09/16/24 07:50


 


Pulse Ox  96   09/16/24 07:50


 


FiO2      








                                 Intake & Output











 09/15/24 09/16/24 09/16/24





 18:59 06:59 18:59


 


Intake Total 1620 360 


 


Balance 1620 360 


 


Intake:   


 


  Oral 1620 360 


 


Other:   


 


  Voiding Method Toilet Toilet 


 


  # Voids 3 3 














- Labs


CBC & Chem 7: 


                                 09/15/24 03:36





                                 09/15/24 03:36

## 2024-09-16 NOTE — P.PN
Subjective


Progress Note Date: 09/16/24





Hospital Course: 


64-year-old male with history of COPD, hypertension, dyslipidemia presenting w

ith small bowel obstruction.  Vital signs, laboratory workup unremarkable.  CT 

abdomen pelvis shows mid abdominal small bowel obstruction, possible transition 

point, mildly enlarged lymph node measuring nearly 1 cm in the region, likely 

reactive, sigmoid diverticulosis.  General surgery consulted.  Due to ongoing 

abdominal pain, had a repeat abdomen pelvis CT which showed interval improvement

of SBO, also noted to have possible underlying mesenteric soft tissue lesion 

measuring 1.2 cm, recommended close CT surveillance follow-up.








Subjective: 


Patient seen and examined at bedside.  No acute events overnight.  Continues to 

have some abdominal pain.





Pertinent positives and negatives as discussed above, a complete review of 

systems was performed and all other systems are negative.








Vitals Signs Reviewed. 





General: Nontoxic, no distress, appears at stated age


Derm: Warm, dry


Head: Atraumatic, normocephalic, symmetric


Eyes: EOMI, no lid lag, anicteric sclera


Mouth: No lip lesion, mucus membranes moist


Cardiovascular: S1S2 reg, no murmur


Lungs: CTA bilateral, no rhonchi, no rales, no accessory muscle use


Abdominal: Soft, diffusely tender to palpation, no guarding, no appreciable orga

nomegaly


Ext: No gross muscle atrophy, no edema, no contractures


Neuro: CN II-XI grossly intact, no focal neuro deficits


Psych: Alert, oriented, appropriate affect





Data Reviewed Today: 


Pertinent Labs: No new labs


Imaging: abdomen pelvis CT report reviewed, showed interval improvement of SBO, 

also noted to have possible underlying mesenteric soft tissue lesion measuring 

1.2 cm, recommended close CT surveillance follow-up.





Assessment and Plan:


Active:


Small bowel obstruction, resolved


Continued abdominal pain


-General Surgery note reviewed, recommending further imaging, which has shown 

resolved SBO


-Now back on clear liquid diet


-Pain control with IV Dilaudid as needed


-Continue Protonix 40 IV twice daily





Mesenteric soft tissue lesion


-1.2 cm noted on abdominal pelvis CT, needs outpatient close surveillance





Hypertension


-Continue lisinopril-hydrochlorothiazide 20-12.5 daily





Depression/mood disorder


-Wellbutrin 300 mg daily, desvenlafaxine 100 mg daily


-Seroquel 100 mg nightly





Dyslipidemia


-Lipitor 20 nightly





COPD, without exacerbation


-Continue albuterol every 6 hours as needed








DVT ppx: Lovenox





Code status: Full code





Anticipated discharge place: Home


Anticipated discharge time: likely tomorrow





Objective





- Vital Signs


Vital signs: 


                                   Vital Signs











Temp  98.1 F   09/16/24 14:46


 


Pulse  82   09/16/24 14:46


 


Resp  14   09/16/24 14:46


 


BP  137/78   09/16/24 14:46


 


Pulse Ox  98   09/16/24 14:46


 


FiO2      








                                 Intake & Output











 09/15/24 09/16/24 09/16/24





 18:59 06:59 18:59


 


Intake Total 1620 360 


 


Balance 1620 360 


 


Intake:   


 


  Oral 1620 360 


 


Other:   


 


  Voiding Method Toilet Toilet 


 


  # Voids 3 3 














- Labs


CBC & Chem 7: 


                                 09/15/24 03:36





                                 09/15/24 03:36

## 2024-09-17 VITALS — TEMPERATURE: 97.8 F | HEART RATE: 83 BPM | SYSTOLIC BLOOD PRESSURE: 125 MMHG | DIASTOLIC BLOOD PRESSURE: 79 MMHG

## 2024-09-17 NOTE — P.PN
Subjective


Progress Note Date: 09/17/24





CHIEF COMPLAINT: Abdominal pain





HISTORY OF PRESENT ILLNESS: Patient does complain of right sided abdominal pain.

 No further nausea.  He is having flatus.  No bowel movement for a couple days. 

CT scan abdomen pelvis had reported interval resolution of patient's recent 

small bowel obstruction.  Some minimal residual reactive small bowel wall 

thickening remaining in the anterior right mid abdomen at the site of previous 

obstruction.  Possible underlying mesenteric soft tissue lesion measuring 1.2 

cm.  CT results were reviewed with Dr. Medina.  Afebrile.





Patient seen and examined with Dr. Medina





PHYSICAL EXAM: 


VITAL SIGNS: Reviewed.


GENERAL: Well-developed in no acute distress. 


ABDOMEN:  Soft.  Nondistended.  Mild tenderness right side of the abdomen


NEUROLOGIC: Alert and oriented. Cranial nerves II through XII grossly intact.





ASSESSMENT: 


1.  Possible partial small bowel obstruction versus ileus.  CT with interval 

resolution of patient's recent small bowel obstruction


2.  Prior history of appendectomy





PLAN: 


-Advance diet to full liquids


-Patient can be discharged from surgical standpoint with outpatient follow-up


-Recommending continuing full liquid diet for the next few days and then 

advancing diet as tolerated














Physician Assistant note has been reviewed by physician. Signing provider agrees

with the documented findings, assessment, and plan of care.





Objective





- Vital Signs


Vital signs: 


                                   Vital Signs











Temp  97.5 F L  09/17/24 07:19


 


Pulse  61   09/17/24 07:19


 


Resp  16   09/17/24 07:19


 


BP  123/65   09/17/24 07:19


 


Pulse Ox  93 L  09/17/24 07:19


 


FiO2      








                                 Intake & Output











 09/16/24 09/17/24 09/17/24





 18:59 06:59 18:59


 


Intake Total 960  


 


Balance 960  


 


Intake:   


 


  Oral 960  


 


Other:   


 


  # Voids 3 3 














- Labs


CBC & Chem 7: 


                                 09/15/24 03:36





                                 09/15/24 03:36

## 2024-09-17 NOTE — P.DS
Providers


Date of admission: 


09/14/24 18:12





Expected date of discharge: 09/17/24


Attending physician: 


Nav Hui MD





Consults: 





                                        





09/14/24 18:01


Consult Physician Routine 


   Consulting Provider: Virgil Medina


   Consult Reason/Comments: SBO, abdominal pain


   Do you want consulting provider notified?: Yes, Notify in am











[facility] 


[facility] Course: 





Discharge Diagnosis:


Small bowel obstruction


Mesenteric soft tissue lesion


Hypertension


Depression/mood disorder


Dyslipidemia


COPD, without exacerbation





Hospital Course: 


64-year-old male with history of COPD, hypertension, dyslipidemia presenting 

with small bowel obstruction.  Vital signs, laboratory workup unremarkable.  CT 

abdomen pelvis shows mid abdominal small bowel obstruction, possible transition 

point, mildly enlarged lymph node measuring nearly 1 cm in the region, likely 

reactive, sigmoid diverticulosis.  General surgery consulted.  Due to ongoing 

abdominal pain, had a repeat abdomen pelvis CT which showed interval improvement

of SBO, also noted to have possible underlying mesenteric soft tissue lesion 

measuring 1.2 cm, recommended close CT surveillance follow-up.  Bowel function 

recovered.  Patient tolerating oral intake well.  He will continue liquid diet 

for the next few days and then changed to regular diet as tolerated.  Follow-up 

with general surgery outpatient.








Patient seen and examined at bedside.





Vital signs reviewed and stable. 


General: Nontoxic, no distress, appears at stated age


Derm: Warm, dry


Head: Atraumatic, normocephalic, symmetric


Eyes: EOMI, no lid lag, anicteric sclera


Mouth: No lip lesion, mucus membranes moist


Cardiovascular: S1S2 reg, no murmur


Lungs: CTA bilateral, no rhonchi, no rales, no accessory muscle use


Abdominal: Soft, mild tender to palpation in the right upper quadrant, no 

guarding, no appreciable organomegaly


Ext: No gross muscle atrophy, no edema, no contractures


Neuro: CN II-XI grossly intact, no focal neuro deficits


Psych: Alert, oriented, appropriate affect








A total of 35 minutes of time were spent preparing this complex discharge 

summary.


Patient was discharged on 9/17/2024 at 1312. 


Patient Condition at Discharge: Stable





Plan - Discharge Summary


Discharge Rx Participant: No


New Discharge Prescriptions: 


Continue


   Albuterol Inhaler [Ventolin Hfa Inhaler] 2 puff INHALATION RT-Q6H PRN


     PRN Reason: Shortness Of Breath


   EPINEPHrine [Epipen 2-Alf] 0.3 mg IM ONCE PRN #1 ml


     PRN Reason: Anaphylaxis


   Desvenlafaxine Succinate [Pristiq] 100 mg PO DAILY


   QUEtiapine [SEROquel] 100 mg PO HS


   Atorvastatin [Lipitor] 20 mg PO HS


   buPROPion HCL [Wellbutrin XL] 300 mg PO DAILY


   polyethylene glycoL 3350 [Miralax] 17 gm PO DAILY 30 Days #30 packet


   Lisinopril-Hctz 20-12.5 mg [Zestoretic 20-12.5] 1 tab PO HS


Discharge Medication List





Albuterol Inhaler [Ventolin Hfa Inhaler] 2 puff INHALATION RT-Q6H PRN 07/12/14 

[History]


EPINEPHrine [Epipen 2-Alf] 0.3 mg IM ONCE PRN #1 ml 07/13/14 [Rx]


Atorvastatin [Lipitor] 20 mg PO HS 12/29/21 [History]


Desvenlafaxine Succinate [Pristiq] 100 mg PO DAILY 12/29/21 [History]


QUEtiapine [SEROquel] 100 mg PO HS 12/29/21 [History]


buPROPion HCL [Wellbutrin XL] 300 mg PO DAILY 12/29/21 [History]


polyethylene glycoL 3350 [Miralax] 17 gm PO DAILY 30 Days #30 packet 08/26/24 

[Rx]


Lisinopril-Hctz 20-12.5 mg [Zestoretic 20-12.5] 1 tab PO HS 09/14/24 [History]








Follow up Appointment(s)/Referral(s): 


John Soriano MD [Primary Care Provider] - 1-2 days


Virgil Medina MD [STAFF PHYSICIAN] - 09/24/24


Patient Instructions/Handouts:  Bowel Obstruction (DC)


Activity/Diet/Wound Care/Special Instructions: 


Continue a Full liquid diet over the next few days and then advance as tolerated


Discharge Disposition: HOME SELF-CARE

## 2024-09-27 NOTE — CONS
CONSULTATION



HISTORY OF PRESENT ILLNESS:

This is a 64-year-old male who was admitted to the hospital with complaints of

abdominal pain and nausea.  The patient had a CAT scan performed, which showed several

loops of dilated small bowel suggestive of the low-grade partial small bowel

obstruction.  The patient states he currently feels better.  He denies any significant

nausea currently.



PAST MEDICAL HISTORY:

Please see admission chart.



PHYSICAL EXAMINATION:

VITAL SIGNS:  Stable.

CHEST:  Clear.

ABDOMEN:  Soft with minimal tenderness.  There is no rebound or guarding.  There are no

surgical scars.



ASSESSMENT AND PLAN:

Partial small bowel obstruction.  The patient has had no significant bowel function.

He will remain n.p.o.  He will receive IV fluids.  We will watch him carefully.





MMODL / IJN: [id number] / Job#: [id number]

## 2024-10-15 ENCOUNTER — HOSPITAL ENCOUNTER (OUTPATIENT)
Dept: HOSPITAL 47 - RADCTMAIN | Age: 64
End: 2024-10-15
Payer: MEDICARE

## 2024-10-15 LAB — BUN SERPL-SCNC: 15 MG/DL (ref 9–20)

## 2024-10-15 PROCEDURE — 74177 CT ABD & PELVIS W/CONTRAST: CPT

## 2024-10-15 PROCEDURE — 84520 ASSAY OF UREA NITROGEN: CPT

## 2024-10-15 PROCEDURE — 36415 COLL VENOUS BLD VENIPUNCTURE: CPT

## 2024-10-15 PROCEDURE — 82565 ASSAY OF CREATININE: CPT

## 2024-10-15 NOTE — CT
EXAMINATION TYPE: CT abdomen pelvis w con

CT DLP: 998.6 mGycm, Automated exposure control for dose reduction was used.

 

DATE OF EXAM: 10/15/2024 4:03 PM

 

COMPARISON: CT abdomen pelvis 9/16/2024, 9/14/2024

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
hy

 

TECHNIQUE:  Standard  CT of the abdomen and pelvis following the administration of 100 cc of Isovue 3
00 IV contrast material and oral contrast. Coronal and sagittal reformats were performed. 

 

FINDINGS: 

LOWER CHEST: Unremarkable

 

ABDOMEN

LIVER: Unremarkable

GALLBLADDER AND BILE DUCTS: Contracted gallbladder. No biliary duct dilatation.

PANCREAS: Unremarkable.

SPLEEN: Unremarkable.

ADRENAL GLANDS: Unremarkable.

KIDNEYS AND URETERS: No evidence of hydronephrosis or renal calculus. The kidneys enhance symmetrical
ly. Contrast is demonstrated within both collecting systems on the delayed phase.

 

PELVIS

BLADDER: Unremarkable

REPRODUCTIVE: Unremarkable.

 

ABDOMEN & PELVIS

STOMACH AND BOWEL: Tiny hilar hernia. Scattered distal colonic diverticulosis without evidence for ac
flores diverticulitis. Enteric contrast reaches the distal small bowel. There is focal circumferential w
all thickening of the small bowel within the right paramedian anterior midabdomen with adjacent linea
r type soft tissue measuring 2.4 x 0.8 cm. (Series 3, image 53). Mild tethered type appearance. No ev
idence of bowel obstruction. 

PERITONEUM: No evidence of pneumoperitoneum or free fluid.

 

VASCULATURE: Mild atherosclerotic calcifications are present throughout the abdominal aorta and its b
ranches. No evidence of aortic aneurysm. 

MUSCULOSKELETAL: No acute osseous abnormalities. Mild degenerative changes of the hips. Left L5 hemis
acralization. Moderate multilevel degenerative disease at L2-L3 and L4-L5. Facet arthropathy mid to l
ower lumbar spine. Dish lower thoracic spine.

LYMPH NODES: Stable mildly prominent gastrohepatic lymph nodes measuring 9 mm short axis. Please refe
rred to above for mesenteric lymphadenopathy description.

SOFT TISSUE/ABDOMINAL WALL: Unremarkable

 

IMPRESSION:

1.  Short segment circumferential wall thickening of the mid right small bowel at site of previous ob
struction. Similar adjacent linear soft tissue within the mesentery with tether-type appearance to th
e bowel. Findings are suspicious for possible benign or malignant neoplasms such as lymphoma, carcino
id tumor, or mesenteric tumor versus other. No evidence for obstruction at this time. Further workup 
is recommended.

2. Nonspecific mildly prominent gastrohepatic lymph nodes. May be reactive versus related to #1.

3. Colonic diverticulosis.

 

 

[facility], Workstation: [id number], 10/15/2024 4:49 PM